# Patient Record
Sex: FEMALE | Race: WHITE | NOT HISPANIC OR LATINO | Employment: FULL TIME | URBAN - METROPOLITAN AREA
[De-identification: names, ages, dates, MRNs, and addresses within clinical notes are randomized per-mention and may not be internally consistent; named-entity substitution may affect disease eponyms.]

---

## 2017-06-21 ENCOUNTER — ALLSCRIPTS OFFICE VISIT (OUTPATIENT)
Dept: OTHER | Facility: OTHER | Age: 57
End: 2017-06-21

## 2017-09-06 DIAGNOSIS — Z12.31 ENCOUNTER FOR SCREENING MAMMOGRAM FOR MALIGNANT NEOPLASM OF BREAST: ICD-10-CM

## 2017-09-13 ENCOUNTER — HOSPITAL ENCOUNTER (OUTPATIENT)
Dept: RADIOLOGY | Age: 57
Discharge: HOME | End: 2017-09-13
Payer: COMMERCIAL

## 2017-09-13 DIAGNOSIS — Z12.31 ENCOUNTER FOR SCREENING MAMMOGRAM FOR MALIGNANT NEOPLASM OF BREAST: ICD-10-CM

## 2017-09-13 PROCEDURE — G0202 SCR MAMMO BI INCL CAD: HCPCS

## 2018-01-13 VITALS
HEART RATE: 70 BPM | OXYGEN SATURATION: 97 % | BODY MASS INDEX: 28.31 KG/M2 | SYSTOLIC BLOOD PRESSURE: 142 MMHG | DIASTOLIC BLOOD PRESSURE: 80 MMHG | WEIGHT: 180.38 LBS | HEIGHT: 67 IN | TEMPERATURE: 97.2 F

## 2018-01-15 NOTE — RESULT NOTES
Message   please call pt , PAP is normal      Verified Results  (1) THIN PREP PAP WITH IMAGING [id number] 12:00AM Ronit Winn     Test Name Result Flag Reference   LAB AP CASE REPORT (Report)     Gynecologic Cytology Report            Case: [id number]                  Authorizing Provider: Rachael Escalante MD   Collected:      03/15/2016           First Screen:     JEROME Ball    Received:      03/17/2016 1206        Specimen:  LIQUID-BASED PAP, SCREENING, Cervix   LAB AP GYN PRIMARY INTERPRETATION      Negative for intraepithelial lesion or malignancy   LAB AP GYN SPECIMEN ADEQUACY      Satisfactory for evaluation  (See note)   LAB AP GYN NOTE      No endocervical cells identified  It is difficult to differentiate between   squamous metaplastic cells and parabasal cells in atrophic specimens due   to numerous causes including menopause, postpartum changes and   progestational agents  LAB AP GYN ADDITIONAL INFORMATION (Report)     [employer]'s FDA approved ,  and ThinPrep Imaging System are   utilized with strict adherence to the 's instruction manual to   prepare gynecologic and non-gynecologic cytology specimens for the   production of ThinPrep slides as well as for gynecologic ThinPrep imaging  These processes have been validated by our laboratory and/or by the     The Pap test is not a diagnostic procedure and should not be used as the   sole means to detect cervical cancer  It is only a screening procedure to   aid in the detection of cervical cancer and its precursors  Both   false-negative and false-positive results have been experienced  Your   patient's test result should be interpreted in this context together with   the history and clinical findings  Signatures   Electronically signed by :  ISMAEL Loyola ; Mar 20 2016 11:34AM EST                       (Author)

## 2018-02-05 DIAGNOSIS — J45.40 MODERATE PERSISTENT ASTHMA, UNSPECIFIED WHETHER COMPLICATED: Primary | ICD-10-CM

## 2018-02-05 RX ORDER — BUDESONIDE AND FORMOTEROL FUMARATE DIHYDRATE 160; 4.5 UG/1; UG/1
AEROSOL RESPIRATORY (INHALATION)
Qty: 10.2 INHALER | Refills: 1 | Status: SHIPPED | OUTPATIENT
Start: 2018-02-05 | End: 2018-04-20 | Stop reason: SDUPTHER

## 2018-04-18 ENCOUNTER — OFFICE VISIT (OUTPATIENT)
Dept: FAMILY MEDICINE CLINIC | Facility: CLINIC | Age: 58
End: 2018-04-18
Payer: COMMERCIAL

## 2018-04-18 VITALS
HEIGHT: 66 IN | TEMPERATURE: 97.9 F | SYSTOLIC BLOOD PRESSURE: 110 MMHG | DIASTOLIC BLOOD PRESSURE: 70 MMHG | WEIGHT: 179.6 LBS | HEART RATE: 80 BPM | RESPIRATION RATE: 16 BRPM | BODY MASS INDEX: 28.87 KG/M2

## 2018-04-18 DIAGNOSIS — Z01.419 WELL WOMAN EXAM: ICD-10-CM

## 2018-04-18 DIAGNOSIS — Z13.6 SCREENING FOR CARDIOVASCULAR CONDITION: ICD-10-CM

## 2018-04-18 DIAGNOSIS — J45.40 MODERATE PERSISTENT ASTHMA WITHOUT COMPLICATION: Primary | ICD-10-CM

## 2018-04-18 DIAGNOSIS — R53.83 OTHER FATIGUE: ICD-10-CM

## 2018-04-18 DIAGNOSIS — Z23 NEED FOR TDAP VACCINATION: ICD-10-CM

## 2018-04-18 PROCEDURE — 99396 PREV VISIT EST AGE 40-64: CPT | Performed by: FAMILY MEDICINE

## 2018-04-18 PROCEDURE — 90471 IMMUNIZATION ADMIN: CPT | Performed by: FAMILY MEDICINE

## 2018-04-18 PROCEDURE — 90715 TDAP VACCINE 7 YRS/> IM: CPT | Performed by: FAMILY MEDICINE

## 2018-04-18 RX ORDER — ALBUTEROL SULFATE 90 UG/1
AEROSOL, METERED RESPIRATORY (INHALATION)
Qty: 1 INHALER | Refills: 5 | Status: SHIPPED | OUTPATIENT
Start: 2018-04-18 | End: 2020-11-05 | Stop reason: SDUPTHER

## 2018-04-18 RX ORDER — ALBUTEROL SULFATE 2.5 MG/3ML
2.5 SOLUTION RESPIRATORY (INHALATION) EVERY 4 HOURS PRN
Qty: 150 ML | Refills: 5 | Status: SHIPPED | OUTPATIENT
Start: 2018-04-18 | End: 2020-11-05 | Stop reason: SDUPTHER

## 2018-04-18 NOTE — PROGRESS NOTES
FAMILY PRACTICE OFFICE VISIT       NAME: Tasneem Aldridge  AGE: 63 y o  SEX: female       : 1960        MRN: [id number]    DATE: 2018  TIME: 11:16 PM    Assessment and Plan     Problem List Items Addressed This Visit     Asthma - Primary    Relevant Medications    albuterol (PROVENTIL HFA,VENTOLIN HFA) 90 mcg/act inhaler    albuterol (2 5 mg/3 mL) 0 083 % nebulizer solution    Other Relevant Orders    Nebulizer    Nebulizer Supplies      Other Visit Diagnoses     Well woman exam        Screening for cardiovascular condition        Relevant Orders    Lipid Panel with Direct LDL reflex    Other fatigue        Relevant Orders    CBC    Comprehensive metabolic panel    TSH, 3rd generation    Need for Tdap vaccination        Relevant Orders    TDAP VACCINE GREATER THAN OR EQUAL TO 8YO IM (Completed)        Annual well exam   Routine blood work orders as outlined above  Patient is up-to-date with colonoscopy and mammography  She will be due for Pap smear in   Asthma-well controlled, continue Symbicort 2 puffs b i d  and albuterol as needed  Patient uses over-the-counter Zyrtec as needed for symptoms of  seasonal allergies  Adacel administered today  There are no Patient Instructions on file for this visit  Chief Complaint     Chief Complaint   Patient presents with    Physical Exam     Pt is here for annual physical and whooping cough vaccine        History of Present Illness     Patient presents for annual well exam   She has been feeling well and offers no complaints  She is up-to-date with colonoscopy and mammography  Eyes her last Pap smear was normal in 2015, no history of abnormal Paps  Asthma symptoms are well controlled on Symbicort 2 puffs twice a day  Patient uses albuterol on a as needed basis only  Patient uses over-the-counter antihistamines on as needed basis during  in spring seasonal allergies  No recent blood work    She is expecting her 1st grandchild and would like to proceed with Tdap vaccination        Review of Systems   Review of Systems   All other systems reviewed and are negative  Active Problem List     Patient Active Problem List   Diagnosis    Asthma       Past Medical History:  Past Medical History:   Diagnosis Date    Allergic     Anemia     Asthma     Pneumonia        Past Surgical History:  Past Surgical History:   Procedure Laterality Date    COLONOSCOPY N/A 4/29/2016    Procedure: COLONOSCOPY;  Surgeon: Jacinta Padilla DO;  Location: [facility] GI [facility]; Service:        Family History:  Family History   Problem Relation Age of Onset    Other Father      bundle branch block; white blood cell disorder    Asthma Daughter        Social History:  Social History     Social History    Marital status: /Civil Union     Spouse name: N/A    Number of children: N/A    Years of education: N/A     Occupational History    Not on file  Social History Main Topics    Smoking status: Never Smoker    Smokeless tobacco: Never Used    Alcohol use Yes      Comment: occasional    Drug use: No    Sexual activity: Not on file     Other Topics Concern    Not on file     Social History Narrative    , works full time       Objective     Vitals:    04/18/18 0812   BP: 110/70   Pulse: 80   Resp: 16   Temp: 97 9 °F (36 6 °C)     Wt Readings from Last 3 Encounters:   04/18/18 81 5 kg (179 lb 9 6 oz)   06/21/17 81 8 kg (180 lb 6 1 oz)   04/29/16 79 4 kg (175 lb)       Physical Exam   Constitutional: She is oriented to person, place, and time  She appears well-developed and well-nourished  HENT:   Head: Normocephalic and atraumatic  Eyes: Conjunctivae are normal    Neck: Neck supple  Carotid bruit is not present  No thyromegaly present  Cardiovascular: Normal rate, regular rhythm and normal heart sounds  No murmur heard  Pulmonary/Chest: Effort normal and breath sounds normal  No respiratory distress  She has no wheezes  She has no rales  Abdominal: Soft  Normal appearance and bowel sounds are normal  She exhibits no distension and no abdominal bruit  There is no tenderness  Musculoskeletal: Normal range of motion  She exhibits no edema  Neurological: She is alert and oriented to person, place, and time  No cranial nerve deficit  Psychiatric: She has a normal mood and affect  Her behavior is normal    Nursing note and vitals reviewed  Pertinent Laboratory/Diagnostic Studies:  Lab Results   Component Value Date    BUN 19 02/03/2016    CREATININE 0 85 02/03/2016    CALCIUM 9 5 02/03/2016     02/03/2016    K 4 3 02/03/2016    CO2 25 02/03/2016     02/03/2016     Lab Results   Component Value Date    ALT 12 02/03/2016    AST 15 02/03/2016    ALKPHOS 52 02/03/2016    BILITOT 0 3 02/03/2016       Lab Results   Component Value Date    WBC 4 6 02/03/2016    HGB 12 5 02/03/2016    HCT 38 0 02/03/2016    MCV 91 8 02/03/2016     02/03/2016       No results found for: TSH    Lab Results   Component Value Date    CHOL 205 (H) 02/03/2016     Lab Results   Component Value Date    TRIG 68 02/03/2016     Lab Results   Component Value Date    HDL 72 02/03/2016     No results found for: LDLCALC  No results found for: HGBA1C    Results for orders placed or performed in visit on 03/15/16   Liquid-based pap, screening   Result Value Ref Range    Case Report       Gynecologic Cytology Report                       Case: [id number]                                  Authorizing Provider:  Camilla Burger MD     Collected:           03/15/2016                   First Screen:          JEROME Hernandez       Received:            03/17/2016 1207              Specimen:    LIQUID-BASED PAP, SCREENING, Cervix                                                        Primary Interpretation Negative for intraepithelial lesion or malignancy     Specimen Adequacy Satisfactory for evaluation   (See note)     Note       No endocervical cells identified  It is difficult to differentiate between squamous metaplastic cells and parabasal cells in atrophic specimens due to numerous causes including menopause, postpartum changes and progestational agents  Additional Information       [employer]'s FDA approved ,  and ThinPrep Imaging System are utilized with strict adherence to the 's instruction manual to prepare gynecologic and non-gynecologic cytology specimens for the production of ThinPrep slides as well as for gynecologic ThinPrep imaging  These processes have been validated by our laboratory and/or by the   The Pap test is not a diagnostic procedure and should not be used as the sole means to detect cervical cancer  It is only a screening procedure to aid in the detection of cervical cancer and its precursors  Both false-negative and false-positive results have been experienced  Your patient's test result should be interpreted in this context together with the history and clinical findings  Orders Placed This Encounter   Procedures    Nebulizer    Nebulizer Supplies    TDAP VACCINE GREATER THAN OR EQUAL TO 6YO IM    CBC    Comprehensive metabolic panel    Lipid Panel with Direct LDL reflex    TSH, 3rd generation       ALLERGIES:  Allergies   Allergen Reactions    Fluticasone-Salmeterol GI Intolerance     Category: Adverse Reaction;     Lac Bovis GI Intolerance    Milk-Related Compounds     Mometasone Furo-Formoterol Fum GI Intolerance     Category:  Adverse Reaction;     Other      Animal dander, pollen, and seasonal        Current Medications     Current Outpatient Prescriptions   Medication Sig Dispense Refill    albuterol (2 5 mg/3 mL) 0 083 % nebulizer solution Take 3 mL (2 5 mg total) by nebulization every 4 (four) hours as needed for wheezing or shortness of breath 150 mL 5    SYMBICORT 160-4 5 MCG/ACT inhaler 2 PUFFS TWICE DAILY 10 2 Inhaler 1    albuterol (PROVENTIL HFA,VENTOLIN HFA) 90 mcg/act inhaler 2 puffs QID PRN,Patient prefers Ventolin brand 1 Inhaler 5     No current facility-administered medications for this visit            Health Maintenance     Health Maintenance   Topic Date Due    HIV SCREENING  1960    Hepatitis C Screening  1960    PNEUMOCOCCAL POLYSACCHARIDE VACCINE AGE 2-64 HIGH RISK  02/22/1963    Depression Screening PHQ-9  02/22/1973    DTaP,Tdap,and Td Vaccines (1 - Tdap) 02/22/1982    INFLUENZA VACCINE  09/01/2017    COLONOSCOPY  04/29/2026     Immunization History   Administered Date(s) Administered    Tdap 04/18/2018       Lyla Iraheta MD

## 2018-04-20 DIAGNOSIS — J45.40 MODERATE PERSISTENT ASTHMA, UNSPECIFIED WHETHER COMPLICATED: ICD-10-CM

## 2018-04-20 RX ORDER — BUDESONIDE AND FORMOTEROL FUMARATE DIHYDRATE 160; 4.5 UG/1; UG/1
2 AEROSOL RESPIRATORY (INHALATION) 2 TIMES DAILY
Qty: 10.2 INHALER | Refills: 11 | Status: SHIPPED | OUTPATIENT
Start: 2018-04-20 | End: 2020-11-05 | Stop reason: SDUPTHER

## 2018-07-14 LAB
ALBUMIN SERPL-MCNC: 4.3 G/DL (ref 3.6–5.1)
ALBUMIN/GLOB SERPL: 1.7 (CALC) (ref 1–2.5)
ALP SERPL-CCNC: 59 U/L (ref 33–130)
ALT SERPL-CCNC: 14 U/L (ref 6–29)
AST SERPL-CCNC: 14 U/L (ref 10–35)
BASOPHILS # BLD AUTO: 18 CELLS/UL (ref 0–200)
BASOPHILS NFR BLD AUTO: 0.3 %
BILIRUB SERPL-MCNC: 0.5 MG/DL (ref 0.2–1.2)
BUN SERPL-MCNC: 14 MG/DL (ref 7–25)
BUN/CREAT SERPL: NORMAL (CALC) (ref 6–22)
CALCIUM SERPL-MCNC: 9.3 MG/DL (ref 8.6–10.4)
CHLORIDE SERPL-SCNC: 106 MMOL/L (ref 98–110)
CHOLEST SERPL-MCNC: 211 MG/DL
CHOLEST/HDLC SERPL: 2.6 (CALC)
CO2 SERPL-SCNC: 26 MMOL/L (ref 20–31)
CREAT SERPL-MCNC: 0.87 MG/DL (ref 0.5–1.05)
EOSINOPHIL # BLD AUTO: 186 CELLS/UL (ref 15–500)
EOSINOPHIL NFR BLD AUTO: 3.1 %
ERYTHROCYTE [DISTWIDTH] IN BLOOD BY AUTOMATED COUNT: 12.8 % (ref 11–15)
GLOBULIN SER CALC-MCNC: 2.5 G/DL (CALC) (ref 1.9–3.7)
GLUCOSE SERPL-MCNC: 98 MG/DL (ref 65–99)
HCT VFR BLD AUTO: 38.9 % (ref 35–45)
HDLC SERPL-MCNC: 80 MG/DL
HGB BLD-MCNC: 13.2 G/DL (ref 11.7–15.5)
LDLC SERPL CALC-MCNC: 116 MG/DL (CALC)
LYMPHOCYTES # BLD AUTO: 1908 CELLS/UL (ref 850–3900)
LYMPHOCYTES NFR BLD AUTO: 31.8 %
MCH RBC QN AUTO: 30.9 PG (ref 27–33)
MCHC RBC AUTO-ENTMCNC: 33.9 G/DL (ref 32–36)
MCV RBC AUTO: 91.1 FL (ref 80–100)
MONOCYTES # BLD AUTO: 468 CELLS/UL (ref 200–950)
MONOCYTES NFR BLD AUTO: 7.8 %
NEUTROPHILS # BLD AUTO: 3420 CELLS/UL (ref 1500–7800)
NEUTROPHILS NFR BLD AUTO: 57 %
NONHDLC SERPL-MCNC: 131 MG/DL (CALC)
PLATELET # BLD AUTO: 269 THOUSAND/UL (ref 140–400)
PMV BLD REES-ECKER: 10.7 FL (ref 7.5–12.5)
POTASSIUM SERPL-SCNC: 4.2 MMOL/L (ref 3.5–5.3)
PROT SERPL-MCNC: 6.8 G/DL (ref 6.1–8.1)
RBC # BLD AUTO: 4.27 MILLION/UL (ref 3.8–5.1)
SL AMB EGFR AFRICAN AMERICAN: 86 ML/MIN/1.73M2
SL AMB EGFR NON AFRICAN AMERICAN: 74 ML/MIN/1.73M2
SODIUM SERPL-SCNC: 139 MMOL/L (ref 135–146)
TRIGL SERPL-MCNC: 59 MG/DL
TSH SERPL-ACNC: 1.25 MIU/L (ref 0.4–4.5)
WBC # BLD AUTO: 6 THOUSAND/UL (ref 3.8–10.8)

## 2018-07-19 ENCOUNTER — TELEPHONE (OUTPATIENT)
Dept: FAMILY MEDICINE CLINIC | Facility: CLINIC | Age: 58
End: 2018-07-19

## 2018-07-19 NOTE — TELEPHONE ENCOUNTER
----- Message from Juan Saleh MD sent at 7/18/2018  6:54 PM EDT -----  Please contact patient, all blood work is normal   Thank you

## 2019-02-02 ENCOUNTER — OFFICE VISIT (OUTPATIENT)
Dept: FAMILY MEDICINE CLINIC | Facility: CLINIC | Age: 59
End: 2019-02-02
Payer: COMMERCIAL

## 2019-02-02 VITALS
DIASTOLIC BLOOD PRESSURE: 78 MMHG | HEIGHT: 66 IN | TEMPERATURE: 97.3 F | WEIGHT: 184.8 LBS | HEART RATE: 76 BPM | SYSTOLIC BLOOD PRESSURE: 118 MMHG | RESPIRATION RATE: 16 BRPM | BODY MASS INDEX: 29.7 KG/M2

## 2019-02-02 DIAGNOSIS — E55.9 VITAMIN D DEFICIENCY: ICD-10-CM

## 2019-02-02 DIAGNOSIS — Z13.6 SCREENING FOR CARDIOVASCULAR CONDITION: ICD-10-CM

## 2019-02-02 DIAGNOSIS — Z12.39 SCREENING FOR BREAST CANCER: ICD-10-CM

## 2019-02-02 DIAGNOSIS — Z00.00 WELL ADULT EXAM: Primary | ICD-10-CM

## 2019-02-02 DIAGNOSIS — R53.83 FATIGUE, UNSPECIFIED TYPE: ICD-10-CM

## 2019-02-02 DIAGNOSIS — J45.40 MODERATE PERSISTENT ASTHMA WITHOUT COMPLICATION: ICD-10-CM

## 2019-02-02 PROCEDURE — 99396 PREV VISIT EST AGE 40-64: CPT | Performed by: FAMILY MEDICINE

## 2019-02-02 NOTE — PROGRESS NOTES
FAMILY PRACTICE OFFICE VISIT       NAME: Jose G Lazaro  AGE: 63 y o  SEX: female       : 1960        MRN: [id number]        Assessment and Plan     Problem List Items Addressed This Visit     Asthma      Other Visit Diagnoses     Well adult exam    -  Primary    Screening for breast cancer        Relevant Orders    Mammo screening bilateral w 3d & cad    Screening for cardiovascular condition        Relevant Orders    Lipid Panel with Direct LDL reflex    Fatigue, unspecified type        Relevant Orders    CBC    Comprehensive metabolic panel    TSH, 3rd generation    Vitamin D 25 hydroxy    Vitamin D deficiency        Relevant Orders    Vitamin D 25 hydroxy       Annual well exam   Patient is up-to-date with colonoscopy and flu vaccine  She will proceed with mammography and will schedule annual gyn exam/ Pap smear  Blood work orders as outlined above  Asthma symptoms are well controlled on Symbicort 2 puffs once a day with rare use of rescue inhalers  Patient is recovering from cold symptoms, exam is unremarkable  Will hold off any prescription medications as she is feeling significantly better  We discussed importance of physical exercise  Patient is motivated to start exercise routine, continue on healthy diet and lose weight  BMI Counseling: Body mass index is 30 05 kg/m²  Discussed the patient's BMI with her  The BMI is above average  BMI counseling and education was provided to the patient  Nutrition recommendations include 3-5 servings of fruits/vegetables daily, consuming healthier snacks, moderation in carbohydrate intake, increasing intake of lean protein, reducing intake of saturated fat and trans fat and reducing intake of cholesterol  Exercise recommendations include exercising 3-5 times per week  There are no Patient Instructions on file for this visit  M*psicofxp software was used to dictate this note  It may contain errors with dictating incorrect words/spelling   Please contact provider directly with any questions  Chief Complaint     Chief Complaint   Patient presents with    Annual Exam     Pt is here for annual exam        History of Present Illness     Patient presents for annual well exam   She feels well and offers no complaints  Daily medications include Symbicort that she uses as 2 puffs daily  Asthma symptoms are well controlled  She is recovering from the cold and denies any chest tightness, wheezing or fever  Patient is past due mammography and Pap smear  She is up-to-date with colonoscopy, last study 2016  She denies chest pain, palpitations, shortness of breath or dizziness  No abdominal pain or dyspepsia  She sleeps well  She has been drinking lot of water and is consuming healthy diet  Patient has not been exercising regularly and is motivated to start exercise routine and lose weight  No recent blood work  We discussed shingles vaccination, patient will research Shingrix vaccination  She is up-to-date with flu vaccine and Tdap  Review of Systems   Review of Systems   Constitutional: Negative  HENT: Negative  Eyes: Negative  Respiratory: Negative  Cardiovascular: Negative  Gastrointestinal: Negative  Endocrine: Negative  Genitourinary: Negative  Musculoskeletal: Negative  Skin: Negative  Neurological: Negative  Hematological: Negative  Psychiatric/Behavioral: Negative  Active Problem List     Patient Active Problem List   Diagnosis    Asthma       Past Medical History:  Past Medical History:   Diagnosis Date    Allergic     Anemia     Asthma     Pneumonia        Past Surgical History:  Past Surgical History:   Procedure Laterality Date    COLONOSCOPY N/A 4/29/2016    Procedure: COLONOSCOPY;  Surgeon: Nirmala Reno DO;  Location: [facility] GI LAB;   Service:        Family History:  Family History   Problem Relation Age of Onset    Other Father         bundle branch block; white blood cell disorder    Asthma Daughter        Social History:  Social History     Social History    Marital status: /Civil Union     Spouse name: N/A    Number of children: N/A    Years of education: N/A     Occupational History    Not on file  Social History Main Topics    Smoking status: Never Smoker    Smokeless tobacco: Never Used    Alcohol use Yes      Comment: occasional    Drug use: No    Sexual activity: Not on file     Other Topics Concern    Not on file     Social History Narrative    , works full time     PHQ-9 Depression Screening    PHQ-9:    Frequency of the following problems over the past two weeks:       Little interest or pleasure in doing things:  0 - not at all  Feeling down, depressed, or hopeless:  0 - not at all  PHQ-2 Score:  0               Objective     Vitals:    02/02/19 0840 02/02/19 0912   BP: 128/68 118/78   BP Location: Left arm    Patient Position: Sitting    Cuff Size: Adult    Pulse: 76    Resp: 16    Temp: (!) 97 3 °F (36 3 °C)    TempSrc: Tympanic    Weight: 83 8 kg (184 lb 12 8 oz)    Height: 5' 5 75" (1 67 m)      Wt Readings from Last 3 Encounters:   02/02/19 83 8 kg (184 lb 12 8 oz)   04/18/18 81 5 kg (179 lb 9 6 oz)   06/21/17 81 8 kg (180 lb 6 1 oz)       Physical Exam   Constitutional: She is oriented to person, place, and time  She appears well-developed and well-nourished  HENT:   Head: Normocephalic and atraumatic  Mouth/Throat: No oropharyngeal exudate  Eyes: Conjunctivae are normal    Neck: Neck supple  No JVD present  Carotid bruit is not present  No thyromegaly present  Cardiovascular: Normal rate, regular rhythm and normal heart sounds  No murmur heard  Pulmonary/Chest: Effort normal and breath sounds normal  No respiratory distress  She has no wheezes  Abdominal: Soft  Bowel sounds are normal  She exhibits no distension and no abdominal bruit  There is no tenderness  There is no rebound     Musculoskeletal: Normal range of motion  She exhibits no edema  Lymphadenopathy:     She has no cervical adenopathy  Neurological: She is alert and oriented to person, place, and time  No cranial nerve deficit  Coordination normal    Psychiatric: She has a normal mood and affect  Her behavior is normal    Nursing note and vitals reviewed        Pertinent Laboratory/Diagnostic Studies:  Lab Results   Component Value Date    BUN 14 07/13/2018    CREATININE 0 85 02/03/2016    CALCIUM 9 3 07/13/2018     02/03/2016    K 4 2 07/13/2018    CO2 26 07/13/2018     07/13/2018     Lab Results   Component Value Date    ALT 14 07/13/2018    AST 14 07/13/2018    ALKPHOS 59 07/13/2018    BILITOT 0 3 02/03/2016       Lab Results   Component Value Date    WBC 6 0 07/13/2018    HGB 13 2 07/13/2018    HCT 38 9 07/13/2018    MCV 91 1 07/13/2018     07/13/2018       Lab Results   Component Value Date    TSH 1 25 07/13/2018       Lab Results   Component Value Date    CHOL 205 (H) 02/03/2016     Lab Results   Component Value Date    TRIG 59 07/13/2018     Lab Results   Component Value Date    HDL 80 07/13/2018     No results found for: LDLCALC  No results found for: HGBA1C    Results for orders placed or performed in visit on 07/13/18   Lipid Panel with Direct LDL reflex   Result Value Ref Range    Total Cholesterol 211 (H) <200 mg/dL    HDL 80 >50 mg/dL    Triglycerides 59 <150 mg/dL    LDL Direct 116 (H) mg/dL (calc)    Chol HDLC Ratio 2 6 <5 0 (calc)    Non-HDL Cholesterol 131 (H) <130 mg/dL (calc)   Comprehensive metabolic panel   Result Value Ref Range    Glucose, Random 98 65 - 99 mg/dL    BUN 14 7 - 25 mg/dL    Creatinine 0 87 0 50 - 1 05 mg/dL    eGFR Non  74 > OR = 60 mL/min/1 73m2    eGFR  86 > OR = 60 mL/min/1 73m2    SL AMB BUN/CREATININE RATIO NOT APPLICABLE 6 - 22 (calc)    Sodium 139 135 - 146 mmol/L    Potassium 4 2 3 5 - 5 3 mmol/L    Chloride 106 98 - 110 mmol/L    CO2 26 20 - 31 mmol/L    SL AMB CALCIUM 9 3 8 6 - 10 4 mg/dL    Protein, Total 6 8 6 1 - 8 1 g/dL    Albumin 4 3 3 6 - 5 1 g/dL    Globulin 2 5 1 9 - 3 7 g/dL (calc)    Albumin/Globulin Ratio 1 7 1 0 - 2 5 (calc)    TOTAL BILIRUBIN 0 5 0 2 - 1 2 mg/dL    Alkaline Phosphatase 59 33 - 130 U/L    AST 14 10 - 35 U/L    ALT 14 6 - 29 U/L   CBC and differential   Result Value Ref Range    White Blood Cell Count 6 0 3 8 - 10 8 Thousand/uL    Red Blood Cell Count 4 27 3 80 - 5 10 Million/uL    Hemoglobin 13 2 11 7 - 15 5 g/dL    HCT 38 9 35 0 - 45 0 %    MCV 91 1 80 0 - 100 0 fL    MCH 30 9 27 0 - 33 0 pg    MCHC 33 9 32 0 - 36 0 g/dL    RDW 12 8 11 0 - 15 0 %    Platelet Count 524 829 - 400 Thousand/uL    SL AMB MPV 10 7 7 5 - 12 5 fL    Neutrophils (Absolute) 3,420 1,500 - 7,800 cells/uL    Lymphocytes (Absolute) 1,908 850 - 3,900 cells/uL    Monocytes (Absolute) 468 200 - 950 cells/uL    Eosinophils (Absolute) 186 15 - 500 cells/uL    Basophils ABS 18 0 - 200 cells/uL    Neutrophils 57 %    Lymphocytes 31 8 %    Monocytes 7 8 %    Eosinophils 3 1 %    Basophils PCT 0 3 %   TSH, 3rd generation   Result Value Ref Range    TSH 1 25 0 40 - 4 50 mIU/L       Orders Placed This Encounter   Procedures    Mammo screening bilateral w 3d & cad    CBC    Comprehensive metabolic panel    Lipid Panel with Direct LDL reflex    TSH, 3rd generation    Vitamin D 25 hydroxy       ALLERGIES:  Allergies   Allergen Reactions    Fluticasone-Salmeterol GI Intolerance     Category: Adverse Reaction;     Milk-Related Compounds Facial Swelling and Lip Swelling    Mometasone Furo-Formoterol Fum GI Intolerance     Category:  Adverse Reaction;     Other Sneezing     Animal dander, pollen, and seasonal        Current Medications     Current Outpatient Prescriptions   Medication Sig Dispense Refill    albuterol (2 5 mg/3 mL) 0 083 % nebulizer solution Take 3 mL (2 5 mg total) by nebulization every 4 (four) hours as needed for wheezing or shortness of breath 150 mL 5    albuterol (PROVENTIL HFA,VENTOLIN HFA) 90 mcg/act inhaler 2 puffs QID PRN,Patient prefers Ventolin brand 1 Inhaler 5    budesonide-formoterol (SYMBICORT) 160-4 5 mcg/act inhaler Inhale 2 puffs 2 (two) times a day (Patient taking differently: Inhale 2 puffs daily  ) 10 2 Inhaler 11     No current facility-administered medications for this visit            Health Maintenance     Health Maintenance   Topic Date Due    Hepatitis C Screening  1960    PAP SMEAR  03/15/2019    MAMMOGRAM  09/13/2019    Depression Screening PHQ  02/02/2020    CRC Screening: Colonoscopy  04/29/2026    DTaP,Tdap,and Td Vaccines (2 - Td) 04/18/2028    INFLUENZA VACCINE  Completed     Immunization History   Administered Date(s) Administered     Influenza (IM) Preservative Free 12/31/2018    Tdap 04/18/2018       Carly Mars MD

## 2019-02-19 ENCOUNTER — ANNUAL EXAM (OUTPATIENT)
Dept: FAMILY MEDICINE CLINIC | Facility: CLINIC | Age: 59
End: 2019-02-19
Payer: COMMERCIAL

## 2019-02-19 VITALS
WEIGHT: 189 LBS | RESPIRATION RATE: 16 BRPM | HEART RATE: 78 BPM | DIASTOLIC BLOOD PRESSURE: 80 MMHG | BODY MASS INDEX: 31.49 KG/M2 | TEMPERATURE: 96.7 F | SYSTOLIC BLOOD PRESSURE: 110 MMHG | HEIGHT: 65 IN

## 2019-02-19 DIAGNOSIS — Z01.419 ENCOUNTER FOR CERVICAL PAP SMEAR WITH PELVIC EXAM: Primary | ICD-10-CM

## 2019-02-19 DIAGNOSIS — Z12.4 SCREENING FOR CERVICAL CANCER: ICD-10-CM

## 2019-02-19 PROCEDURE — 87624 HPV HI-RISK TYP POOLED RSLT: CPT | Performed by: FAMILY MEDICINE

## 2019-02-19 PROCEDURE — G0145 SCR C/V CYTO,THINLAYER,RESCR: HCPCS | Performed by: FAMILY MEDICINE

## 2019-02-19 PROCEDURE — 99213 OFFICE O/P EST LOW 20 MIN: CPT | Performed by: FAMILY MEDICINE

## 2019-02-20 NOTE — PROGRESS NOTES
FAMILY PRACTICE OFFICE VISIT       NAME: Shanice Gonzales  AGE: 63 y o  SEX: female       : 1960        MRN: [id number]        Assessment and Plan     Problem List Items Addressed This Visit     None      Visit Diagnoses     Encounter for cervical Pap smear with pelvic exam    -  Primary    Relevant Orders    Liquid-based pap, screening    Screening for cervical cancer        Relevant Orders    Liquid-based pap, screening    HPV High Risk       Annual pelvic exam   Pap smear and HPV are pending  No history of abnormal Pap smear  Advised to continue with annual mammography  There are no Patient Instructions on file for this visit  [employer] software was used to dictate this note  It may contain errors with dictating incorrect words/spelling  Please contact provider directly with any questions  Chief Complaint     Chief Complaint   Patient presents with    Gynecologic Exam     Patient is here for her yearly exam        History of Present Illness     Patient presents for pelvic exam/Pap smear  She is in menopause  Denies hot flashes  No vaginal bleeding, no discharge, no pelvic pain  She will be scheduling mammography shortly  Patient offers no complaints other complaints today  Review of Systems   Review of Systems   Constitutional: Negative  HENT: Negative  Respiratory: Negative  Cardiovascular: Negative  Gastrointestinal: Negative  Genitourinary: Negative  Musculoskeletal: Negative  Neurological: Negative  Hematological: Negative  Psychiatric/Behavioral: Negative          Active Problem List     Patient Active Problem List   Diagnosis    Asthma       Past Medical History:  Past Medical History:   Diagnosis Date    Allergic     Anemia     Asthma     Pneumonia        Past Surgical History:  Past Surgical History:   Procedure Laterality Date    COLONOSCOPY N/A 2016    Procedure: COLONOSCOPY;  Surgeon: Michelle Mcintyre DO;  Location: [facility];  Service:        Family History:  Family History   Problem Relation Age of Onset    Other Father         bundle branch block; white blood cell disorder    Asthma Daughter        Social History:  Social History     Socioeconomic History    Marital status: /Civil Union     Spouse name: Not on file    Number of children: Not on file    Years of education: Not on file    Highest education level: Not on file   Occupational History    Not on file   Social Needs    Financial resource strain: Not on file    Food insecurity:     Worry: Not on file     Inability: Not on file    Transportation needs:     Medical: Not on file     Non-medical: Not on file   Tobacco Use    Smoking status: Never Smoker    Smokeless tobacco: Never Used   Substance and Sexual Activity    Alcohol use: Yes     Comment: occasional    Drug use: No    Sexual activity: Not on file   Lifestyle    Physical activity:     Days per week: Not on file     Minutes per session: Not on file    Stress: Not on file   Relationships    Social connections:     Talks on phone: Not on file     Gets together: Not on file     Attends Jainism service: Not on file     Active member of club or organization: Not on file     Attends meetings of clubs or organizations: Not on file     Relationship status: Not on file    Intimate partner violence:     Fear of current or ex partner: Not on file     Emotionally abused: Not on file     Physically abused: Not on file     Forced sexual activity: Not on file   Other Topics Concern    Not on file   Social History Narrative    , works full time       Objective     Vitals:    02/19/19 1848   BP: 110/80   Pulse: 78   Resp: 16   Temp: (!) 96 7 °F (35 9 °C)   TempSrc: Tympanic   Weight: 85 7 kg (189 lb)   Height: 5' 5" (1 651 m)     Wt Readings from Last 3 Encounters:   02/19/19 85 7 kg (189 lb)   02/02/19 83 8 kg (184 lb 12 8 oz)   04/18/18 81 5 kg (179 lb 9 6 oz)       Physical Exam   Constitutional: She is oriented to person, place, and time  She appears well-developed and well-nourished  HENT:   Head: Normocephalic and atraumatic  Neck: Neck supple  Carotid bruit is not present  No thyromegaly present  Pulmonary/Chest: Right breast exhibits no mass  Left breast exhibits no mass  No breast discharge  Breasts are symmetrical    Abdominal: Soft  Bowel sounds are normal  She exhibits no distension  There is no tenderness  Genitourinary: Vagina normal and uterus normal  No breast discharge  There is no rash on the right labia  There is no rash on the left labia  Uterus is not tender  Cervix exhibits no motion tenderness and no discharge  Right adnexum displays no mass and no tenderness  Left adnexum displays no mass, no tenderness and no fullness  No vaginal discharge found  Musculoskeletal: Normal range of motion  She exhibits no edema  Lymphadenopathy:        Right axillary: No pectoral and no lateral adenopathy present  Left axillary: No pectoral and no lateral adenopathy present  Neurological: She is alert and oriented to person, place, and time  No cranial nerve deficit  Psychiatric: She has a normal mood and affect  Her behavior is normal    Nursing note and vitals reviewed        Pertinent Laboratory/Diagnostic Studies:  Lab Results   Component Value Date    BUN 14 07/13/2018    CREATININE 0 85 02/03/2016    CALCIUM 9 3 07/13/2018     02/03/2016    K 4 2 07/13/2018    CO2 26 07/13/2018     07/13/2018     Lab Results   Component Value Date    ALT 14 07/13/2018    AST 14 07/13/2018    ALKPHOS 59 07/13/2018    BILITOT 0 3 02/03/2016       Lab Results   Component Value Date    WBC 6 0 07/13/2018    HGB 13 2 07/13/2018    HCT 38 9 07/13/2018    MCV 91 1 07/13/2018     07/13/2018       Lab Results   Component Value Date    TSH 1 25 07/13/2018       Lab Results   Component Value Date    CHOL 205 (H) 02/03/2016     Lab Results   Component Value Date    TRIG 59 07/13/2018 Lab Results   Component Value Date    HDL 80 07/13/2018     No results found for: LDLCALC  No results found for: HGBA1C    Results for orders placed or performed in visit on 07/13/18   Lipid Panel with Direct LDL reflex   Result Value Ref Range    Total Cholesterol 211 (H) <200 mg/dL    HDL 80 >50 mg/dL    Triglycerides 59 <150 mg/dL    LDL Direct 116 (H) mg/dL (calc)    Chol HDLC Ratio 2 6 <5 0 (calc)    Non-HDL Cholesterol 131 (H) <130 mg/dL (calc)   Comprehensive metabolic panel   Result Value Ref Range    Glucose, Random 98 65 - 99 mg/dL    BUN 14 7 - 25 mg/dL    Creatinine 0 87 0 50 - 1 05 mg/dL    eGFR Non  74 > OR = 60 mL/min/1 73m2    eGFR  86 > OR = 60 mL/min/1 73m2    SL AMB BUN/CREATININE RATIO NOT APPLICABLE 6 - 22 (calc)    Sodium 139 135 - 146 mmol/L    Potassium 4 2 3 5 - 5 3 mmol/L    Chloride 106 98 - 110 mmol/L    CO2 26 20 - 31 mmol/L    SL AMB CALCIUM 9 3 8 6 - 10 4 mg/dL    Protein, Total 6 8 6 1 - 8 1 g/dL    Albumin 4 3 3 6 - 5 1 g/dL    Globulin 2 5 1 9 - 3 7 g/dL (calc)    Albumin/Globulin Ratio 1 7 1 0 - 2 5 (calc)    TOTAL BILIRUBIN 0 5 0 2 - 1 2 mg/dL    Alkaline Phosphatase 59 33 - 130 U/L    AST 14 10 - 35 U/L    ALT 14 6 - 29 U/L   CBC and differential   Result Value Ref Range    White Blood Cell Count 6 0 3 8 - 10 8 Thousand/uL    Red Blood Cell Count 4 27 3 80 - 5 10 Million/uL    Hemoglobin 13 2 11 7 - 15 5 g/dL    HCT 38 9 35 0 - 45 0 %    MCV 91 1 80 0 - 100 0 fL    MCH 30 9 27 0 - 33 0 pg    MCHC 33 9 32 0 - 36 0 g/dL    RDW 12 8 11 0 - 15 0 %    Platelet Count 548 154 - 400 Thousand/uL    SL AMB MPV 10 7 7 5 - 12 5 fL    Neutrophils (Absolute) 3,420 1,500 - 7,800 cells/uL    Lymphocytes (Absolute) 1,908 850 - 3,900 cells/uL    Monocytes (Absolute) 468 200 - 950 cells/uL    Eosinophils (Absolute) 186 15 - 500 cells/uL    Basophils ABS 18 0 - 200 cells/uL    Neutrophils 57 %    Lymphocytes 31 8 %    Monocytes 7 8 %    Eosinophils 3 1 % Basophils PCT 0 3 %   TSH, 3rd generation   Result Value Ref Range    TSH 1 25 0 40 - 4 50 mIU/L       Orders Placed This Encounter   Procedures    HPV High Risk       ALLERGIES:  Allergies   Allergen Reactions    Fluticasone-Salmeterol GI Intolerance     Category: Adverse Reaction;     Milk-Related Compounds Facial Swelling and Lip Swelling    Mometasone Furo-Formoterol Fum GI Intolerance     Category: Adverse Reaction;     Other Sneezing     Animal dander, pollen, and seasonal        Current Medications     Current Outpatient Medications   Medication Sig Dispense Refill    albuterol (2 5 mg/3 mL) 0 083 % nebulizer solution Take 3 mL (2 5 mg total) by nebulization every 4 (four) hours as needed for wheezing or shortness of breath 150 mL 5    albuterol (PROVENTIL HFA,VENTOLIN HFA) 90 mcg/act inhaler 2 puffs QID PRN,Patient prefers Ventolin brand 1 Inhaler 5    budesonide-formoterol (SYMBICORT) 160-4 5 mcg/act inhaler Inhale 2 puffs 2 (two) times a day (Patient taking differently: Inhale 2 puffs daily  ) 10 2 Inhaler 11     No current facility-administered medications for this visit            Health Maintenance     Health Maintenance   Topic Date Due    Hepatitis C Screening  1960    PAP SMEAR  03/15/2019    MAMMOGRAM  09/13/2019    Depression Screening PHQ  02/02/2020    BMI: Adult  02/02/2020    BMI: Followup Plan  02/04/2020    CRC Screening: Colonoscopy  04/29/2026    DTaP,Tdap,and Td Vaccines (2 - Td) 04/18/2028    INFLUENZA VACCINE  Completed    HEPATITIS B VACCINES  Aged Out     Immunization History   Administered Date(s) Administered     Influenza (IM) Preservative Free 12/31/2018    Tdap 04/18/2018       Iris Reynolds MD

## 2019-02-25 LAB
HPV HR 12 DNA CVX QL NAA+PROBE: NEGATIVE
HPV16 DNA CVX QL NAA+PROBE: NEGATIVE
HPV18 DNA CVX QL NAA+PROBE: NEGATIVE

## 2019-02-26 ENCOUNTER — TELEPHONE (OUTPATIENT)
Dept: FAMILY MEDICINE CLINIC | Facility: CLINIC | Age: 59
End: 2019-02-26

## 2019-02-26 LAB
LAB AP GYN PRIMARY INTERPRETATION: NORMAL
Lab: NORMAL

## 2019-02-27 NOTE — TELEPHONE ENCOUNTER
----- Message from Melva Schaumann, MD sent at 2/26/2019  8:04 PM EST -----  Please contact patient  Her Pap smear is normal, HPV testing was negative  No need for Pap smear for 3 years    Thanks

## 2019-05-09 ENCOUNTER — OFFICE VISIT (OUTPATIENT)
Dept: FAMILY MEDICINE CLINIC | Facility: CLINIC | Age: 59
End: 2019-05-09
Payer: COMMERCIAL

## 2019-05-09 VITALS
RESPIRATION RATE: 16 BRPM | HEIGHT: 65 IN | WEIGHT: 183.25 LBS | HEART RATE: 100 BPM | OXYGEN SATURATION: 97 % | DIASTOLIC BLOOD PRESSURE: 80 MMHG | SYSTOLIC BLOOD PRESSURE: 110 MMHG | TEMPERATURE: 99.7 F | BODY MASS INDEX: 30.53 KG/M2

## 2019-05-09 DIAGNOSIS — J45.40 MODERATE PERSISTENT ASTHMA WITHOUT COMPLICATION: ICD-10-CM

## 2019-05-09 DIAGNOSIS — J32.9 SINUSITIS, UNSPECIFIED CHRONICITY, UNSPECIFIED LOCATION: ICD-10-CM

## 2019-05-09 DIAGNOSIS — J06.9 UPPER RESPIRATORY TRACT INFECTION, UNSPECIFIED TYPE: Primary | ICD-10-CM

## 2019-05-09 PROCEDURE — 99213 OFFICE O/P EST LOW 20 MIN: CPT | Performed by: FAMILY MEDICINE

## 2019-05-09 PROCEDURE — 3008F BODY MASS INDEX DOCD: CPT | Performed by: FAMILY MEDICINE

## 2019-05-09 RX ORDER — AZITHROMYCIN 250 MG/1
TABLET, FILM COATED ORAL
Qty: 6 TABLET | Refills: 0 | Status: SHIPPED | OUTPATIENT
Start: 2019-05-09 | End: 2019-05-13

## 2019-05-09 RX ORDER — METHYLPREDNISOLONE 4 MG/1
TABLET ORAL
Qty: 21 EACH | Refills: 0 | Status: SHIPPED | OUTPATIENT
Start: 2019-05-09 | End: 2019-12-08 | Stop reason: ALTCHOICE

## 2019-06-19 LAB
25(OH)D3 SERPL-MCNC: 29 NG/ML (ref 30–100)
ALBUMIN SERPL-MCNC: 3.8 G/DL (ref 3.6–5.1)
ALBUMIN/GLOB SERPL: 1.7 (CALC) (ref 1–2.5)
ALP SERPL-CCNC: 51 U/L (ref 33–130)
ALT SERPL-CCNC: 15 U/L (ref 6–29)
AST SERPL-CCNC: 15 U/L (ref 10–35)
BASOPHILS # BLD AUTO: 18 CELLS/UL (ref 0–200)
BASOPHILS NFR BLD AUTO: 0.4 %
BILIRUB SERPL-MCNC: 0.3 MG/DL (ref 0.2–1.2)
BUN SERPL-MCNC: 17 MG/DL (ref 7–25)
BUN/CREAT SERPL: NORMAL (CALC) (ref 6–22)
CALCIUM SERPL-MCNC: 8.9 MG/DL (ref 8.6–10.4)
CHLORIDE SERPL-SCNC: 107 MMOL/L (ref 98–110)
CHOLEST SERPL-MCNC: 190 MG/DL
CHOLEST/HDLC SERPL: 3.2 (CALC)
CO2 SERPL-SCNC: 28 MMOL/L (ref 20–32)
CREAT SERPL-MCNC: 0.89 MG/DL (ref 0.5–1.05)
EOSINOPHIL # BLD AUTO: 252 CELLS/UL (ref 15–500)
EOSINOPHIL NFR BLD AUTO: 5.6 %
ERYTHROCYTE [DISTWIDTH] IN BLOOD BY AUTOMATED COUNT: 12.7 % (ref 11–15)
GLOBULIN SER CALC-MCNC: 2.3 G/DL (CALC) (ref 1.9–3.7)
GLUCOSE SERPL-MCNC: 86 MG/DL (ref 65–99)
HCT VFR BLD AUTO: 37.6 % (ref 35–45)
HDLC SERPL-MCNC: 60 MG/DL
HGB BLD-MCNC: 12.5 G/DL (ref 11.7–15.5)
LDLC SERPL CALC-MCNC: 114 MG/DL (CALC)
LYMPHOCYTES # BLD AUTO: 1760 CELLS/UL (ref 850–3900)
LYMPHOCYTES NFR BLD AUTO: 39.1 %
MCH RBC QN AUTO: 30.3 PG (ref 27–33)
MCHC RBC AUTO-ENTMCNC: 33.2 G/DL (ref 32–36)
MCV RBC AUTO: 91.3 FL (ref 80–100)
MONOCYTES # BLD AUTO: 342 CELLS/UL (ref 200–950)
MONOCYTES NFR BLD AUTO: 7.6 %
NEUTROPHILS # BLD AUTO: 2129 CELLS/UL (ref 1500–7800)
NEUTROPHILS NFR BLD AUTO: 47.3 %
NONHDLC SERPL-MCNC: 130 MG/DL (CALC)
PLATELET # BLD AUTO: 250 THOUSAND/UL (ref 140–400)
PMV BLD REES-ECKER: 10.6 FL (ref 7.5–12.5)
POTASSIUM SERPL-SCNC: 4 MMOL/L (ref 3.5–5.3)
PROT SERPL-MCNC: 6.1 G/DL (ref 6.1–8.1)
RBC # BLD AUTO: 4.12 MILLION/UL (ref 3.8–5.1)
SL AMB EGFR AFRICAN AMERICAN: 83 ML/MIN/1.73M2
SL AMB EGFR NON AFRICAN AMERICAN: 71 ML/MIN/1.73M2
SODIUM SERPL-SCNC: 139 MMOL/L (ref 135–146)
TRIGL SERPL-MCNC: 64 MG/DL
TSH SERPL-ACNC: 1.05 MIU/L (ref 0.4–4.5)
WBC # BLD AUTO: 4.5 THOUSAND/UL (ref 3.8–10.8)

## 2019-06-20 ENCOUNTER — TELEPHONE (OUTPATIENT)
Dept: FAMILY MEDICINE CLINIC | Facility: CLINIC | Age: 59
End: 2019-06-20

## 2019-06-28 ENCOUNTER — HOSPITAL ENCOUNTER (OUTPATIENT)
Dept: RADIOLOGY | Age: 59
Discharge: HOME | End: 2019-06-28
Payer: COMMERCIAL

## 2019-06-28 VITALS — WEIGHT: 183 LBS | HEIGHT: 65 IN | BODY MASS INDEX: 30.49 KG/M2

## 2019-06-28 DIAGNOSIS — Z12.39 SCREENING FOR BREAST CANCER: ICD-10-CM

## 2019-06-28 PROCEDURE — 77067 SCR MAMMO BI INCL CAD: CPT

## 2019-06-28 PROCEDURE — 77063 BREAST TOMOSYNTHESIS BI: CPT

## 2019-12-08 ENCOUNTER — OFFICE VISIT (OUTPATIENT)
Dept: URGENT CARE | Facility: CLINIC | Age: 59
End: 2019-12-08
Payer: COMMERCIAL

## 2019-12-08 ENCOUNTER — APPOINTMENT (OUTPATIENT)
Dept: RADIOLOGY | Facility: CLINIC | Age: 59
End: 2019-12-08
Payer: COMMERCIAL

## 2019-12-08 VITALS
SYSTOLIC BLOOD PRESSURE: 116 MMHG | DIASTOLIC BLOOD PRESSURE: 70 MMHG | WEIGHT: 184 LBS | HEIGHT: 65 IN | BODY MASS INDEX: 30.66 KG/M2 | HEART RATE: 79 BPM | TEMPERATURE: 97.4 F | RESPIRATION RATE: 18 BRPM | OXYGEN SATURATION: 98 %

## 2019-12-08 DIAGNOSIS — M54.2 NECK PAIN: ICD-10-CM

## 2019-12-08 DIAGNOSIS — M54.2 NECK PAIN: Primary | ICD-10-CM

## 2019-12-08 PROCEDURE — 96372 THER/PROPH/DIAG INJ SC/IM: CPT | Performed by: NURSE PRACTITIONER

## 2019-12-08 PROCEDURE — S9083 URGENT CARE CENTER GLOBAL: HCPCS | Performed by: NURSE PRACTITIONER

## 2019-12-08 PROCEDURE — G0382 LEV 3 HOSP TYPE B ED VISIT: HCPCS | Performed by: NURSE PRACTITIONER

## 2019-12-08 PROCEDURE — 72050 X-RAY EXAM NECK SPINE 4/5VWS: CPT

## 2019-12-08 RX ORDER — CYCLOBENZAPRINE HCL 5 MG
5 TABLET ORAL 3 TIMES DAILY PRN
Qty: 30 TABLET | Refills: 0 | Status: SHIPPED | OUTPATIENT
Start: 2019-12-08 | End: 2019-12-24 | Stop reason: SDUPTHER

## 2019-12-08 RX ORDER — NAPROXEN 500 MG/1
500 TABLET ORAL 2 TIMES DAILY WITH MEALS
Qty: 14 TABLET | Refills: 0 | Status: SHIPPED | OUTPATIENT
Start: 2019-12-08 | End: 2019-12-31 | Stop reason: ALTCHOICE

## 2019-12-08 RX ORDER — PREDNISONE 10 MG/1
TABLET ORAL
Qty: 21 TABLET | Refills: 0 | Status: SHIPPED | OUTPATIENT
Start: 2019-12-08 | End: 2019-12-24

## 2019-12-08 RX ORDER — KETOROLAC TROMETHAMINE 30 MG/ML
30 INJECTION, SOLUTION INTRAMUSCULAR; INTRAVENOUS ONCE
Status: COMPLETED | OUTPATIENT
Start: 2019-12-08 | End: 2019-12-08

## 2019-12-08 RX ADMIN — KETOROLAC TROMETHAMINE 30 MG: 30 INJECTION, SOLUTION INTRAMUSCULAR; INTRAVENOUS at 09:59

## 2019-12-08 NOTE — PROGRESS NOTES
Assessment/Plan    Neck pain [M54 2]  1  Neck pain  XR spine cervical complete 4 or 5 vw non injury    ketorolac (TORADOL) injection 30 mg    predniSONE 10 mg tablet    cyclobenzaprine (FLEXERIL) 5 mg tablet    naproxen (EC NAPROSYN) 500 MG EC tablet         Subjective:     Patient ID: Do Ortega is a 63 y o  female  Reason For Visit / Chief Complaint  Chief Complaint   Patient presents with    Neck Pain     Patient here with right sided neck pain that started several weeks ago but is getting worse  She reports that the pain radiates down her right arm  She had a massage and went to an [facility] yesterday for same  This is a 78-year-old female patient who presents to the urgent care today  Patient is complaining of neck pain for approximately 4 weeks  Patient states that several weeks ago she woke up and felt neck pain  Patient felt like she slept incorrectly  Patient tried using warm compresses and warm showers  Patient had minimal relief  This week she did go for massage and for acupuncture which did help her neck discomfort  Patient was having numbness and tingling in her right arm which resolved yesterday after having acupuncture  Patient is here for continued pain and evaluation  Patient denies traumatic injury  Patient denies fever or chills  Patient denies issues with urination or defecation  Patient denies chest pain or shortness of breath  Patient denies dizziness, loss of consciousness or slurred speech  Past Medical History:   Diagnosis Date    Allergic     Anemia     Asthma     Pneumonia        Past Surgical History:   Procedure Laterality Date    COLONOSCOPY N/A 4/29/2016    Procedure: COLONOSCOPY;  Surgeon: Deyanira Serrano DO;  Location: [facility];   Service:        Family History   Problem Relation Age of Onset    No Known Problems Mother     Other Father         bundle branch block; white blood cell disorder    Asthma Daughter     No Known Problems Sister     No Known Problems Maternal Grandmother     No Known Problems Maternal Grandfather     No Known Problems Paternal Grandmother     No Known Problems Paternal Grandfather     No Known Problems Sister     Asthma Daughter     No Known Problems Paternal Aunt     Kidney cancer Brother 39       Review of Systems   Constitutional: Negative for chills and fever  Respiratory: Negative for shortness of breath  Cardiovascular: Negative for chest pain  Genitourinary: Negative for difficulty urinating  Musculoskeletal: Positive for myalgias and neck pain  Negative for back pain and gait problem  Skin: Negative for color change and wound  Neurological: Negative for dizziness, facial asymmetry, speech difficulty, weakness, light-headedness, numbness and headaches  Objective:    /70 (BP Location: Left arm, Patient Position: Sitting, Cuff Size: Large)   Pulse 79   Temp (!) 97 4 °F (36 3 °C) (Tympanic)   Resp 18   Ht 5' 5" (1 651 m)   Wt 83 5 kg (184 lb)   SpO2 98%   BMI 30 62 kg/m²     Physical Exam   Constitutional: She is oriented to person, place, and time  Vital signs are normal  She appears well-developed and well-nourished  No distress  HENT:   Head: Normocephalic and atraumatic  Neck: Spinous process tenderness and muscular tenderness present  No neck rigidity  Decreased range of motion present  No edema and no erythema present  Cardiovascular: Normal rate, regular rhythm, S1 normal, S2 normal and normal heart sounds  Exam reveals no gallop and no friction rub  No murmur heard  Pulmonary/Chest: Effort normal and breath sounds normal  No stridor  No respiratory distress  She has no decreased breath sounds  She has no wheezes  She has no rhonchi  She has no rales  She exhibits no tenderness  Neurological: She is alert and oriented to person, place, and time  Skin: Skin is warm and dry  Capillary refill takes less than 2 seconds  She is not diaphoretic  Psychiatric: She has a normal mood and affect

## 2019-12-08 NOTE — PATIENT INSTRUCTIONS
We saw you today for neck pain  Take the steroid as prescribed and until completed  You may take the muscle relaxant every 8 hours to relieve muscle spasm  Please be advised that it does cause drowsiness so do allow for sleep after taking and do not drive a vehicle while taking  Follow-up with your doctor tomorrow for further evaluation  Apply heat to her neck

## 2019-12-09 ENCOUNTER — OFFICE VISIT (OUTPATIENT)
Dept: FAMILY MEDICINE CLINIC | Facility: CLINIC | Age: 59
End: 2019-12-09
Payer: COMMERCIAL

## 2019-12-09 VITALS
HEART RATE: 81 BPM | RESPIRATION RATE: 18 BRPM | DIASTOLIC BLOOD PRESSURE: 80 MMHG | HEIGHT: 65 IN | BODY MASS INDEX: 31.06 KG/M2 | WEIGHT: 186.4 LBS | TEMPERATURE: 98.6 F | SYSTOLIC BLOOD PRESSURE: 126 MMHG | OXYGEN SATURATION: 96 %

## 2019-12-09 DIAGNOSIS — R29.898 WEAKNESS OF RIGHT ARM: ICD-10-CM

## 2019-12-09 DIAGNOSIS — M50.30 DDD (DEGENERATIVE DISC DISEASE), CERVICAL: Primary | ICD-10-CM

## 2019-12-09 DIAGNOSIS — M79.2 RADICULAR PAIN OF RIGHT UPPER EXTREMITY: ICD-10-CM

## 2019-12-09 PROBLEM — M54.2 CHRONIC NECK PAIN: Status: ACTIVE | Noted: 2019-12-09

## 2019-12-09 PROBLEM — G89.29 CHRONIC NECK PAIN: Status: ACTIVE | Noted: 2019-12-09

## 2019-12-09 PROCEDURE — 3008F BODY MASS INDEX DOCD: CPT | Performed by: FAMILY MEDICINE

## 2019-12-09 PROCEDURE — 99214 OFFICE O/P EST MOD 30 MIN: CPT | Performed by: FAMILY MEDICINE

## 2019-12-09 NOTE — PROGRESS NOTES
FAMILY PRACTICE OFFICE VISIT       NAME: Olu Garcia  AGE: 63 y o  SEX: female       : 1960        MRN: [id number]        Assessment and Plan     Problem List Items Addressed This Visit     None      Visit Diagnoses     DDD (degenerative disc disease), cervical    -  Primary    Relevant Orders    MRI cervical spine wo contrast    Radicular pain of right upper extremity        Relevant Orders    MRI cervical spine wo contrast    Weakness of right arm        Relevant Orders    MRI cervical spine wo contrast        Patient presents for evaluation of neck pain, cervical disc disease and right upper extremity radiculopathy with persistent numbness, tingling and weakness of abdominal on right arm  Patient did notice partial improvement with start of prednisone taper and Flexeril  She did experience transient relief with acupuncture and massage, her symptoms have recurred within few hours after completing those treatments  Exam reveals right upper extremity weakness  X-ray of cervical spine performed at [facility] reviewed with patient today, revealing mild to moderate facet spondylosis throughout the cervical spine along with mild C3-C4 disc space narrowing  Patient will continue on prednisone taper  I advised her to use Flexeril as 10 mg q h s  And avoid medication daytime due to sedating side effects  Patient will contact me if her symptoms will be recurring as dose of prednisone is being weaned off within next few days  Patient may start naproxen once prednisone is completed, she should not be combining both medications together  I advised patient to schedule MRI of cervical spine  I will be contacting her regarding results of this study, patient will call me with any questions or concerns      There are no Patient Instructions on file for this visit  Discussed with the patient and all questioned fully answered  She will call me if any problems arise       [employer] software was used to dictate this note  It may contain errors with dictating incorrect words/spelling  Please contact provider directly with any questions  Chief Complaint     Chief Complaint   Patient presents with    Neck Pain     pinched nerve in neck       History of Present Illness     Patient presents for evaluation of  neck pain radiating to the right upper back and right upper extremity  Reportedly she has been experiencing intermittent neck discomfort within past few months, it was not quite bothersome till a week ago  Patient noticed significant worsening of neck pain and radiation of discomfort down to the right arm and all digits after driving to [facility] a week ago  Her symptoms somewhat improved after medical massage and acupuncture but have recurred  Subsequently, patient was seen at [facility] and had neck x-ray done revealing C3-C4 disc disease  Patient was treated with prednisone taper starting at 60 mg with dose reduction by 10 mg every day  She was also prescribed Flexeril and naproxen  Patient has tried Flexeril and it has helped  She actually feels slightly better today  Currently neck pain radiates down to the right elbow  Patient admits to significant weakness in her right dominant arm  Patient denies any recent or remote neck injury  She denies symptoms of chest pain, palpitations, shortness of breath or dizziness  No headache  No numbness, tingling or paresthesias elsewhere aside from right arm    Neck Pain    Associated symptoms include numbness and weakness (Right upper extremity)  Pertinent negatives include no headaches  Review of Systems   Review of Systems   Constitutional: Negative  HENT: Negative  Respiratory: Negative  Cardiovascular: Negative  Gastrointestinal: Negative  Endocrine: Negative  Genitourinary: Negative  Musculoskeletal: Positive for neck pain  Skin: Negative  Allergic/Immunologic: Negative  Neurological: Positive for weakness (Right upper extremity) and numbness  Negative for dizziness, facial asymmetry and headaches  Hematological: Negative  Psychiatric/Behavioral: Negative  Active Problem List     Patient Active Problem List   Diagnosis    Asthma    Chronic neck pain       Past Medical History:  Past Medical History:   Diagnosis Date    Allergic     Anemia     Asthma     Pneumonia        Past Surgical History:  Past Surgical History:   Procedure Laterality Date    COLONOSCOPY N/A 4/29/2016    Procedure: COLONOSCOPY;  Surgeon: Carla Rangel DO;  Location: [facility];   Service:        Family History:  Family History   Problem Relation Age of Onset    No Known Problems Mother     Other Father         bundle branch block; white blood cell disorder    Asthma Daughter     No Known Problems Sister     No Known Problems Maternal Grandmother     No Known Problems Maternal Grandfather     No Known Problems Paternal Grandmother     No Known Problems Paternal Grandfather     No Known Problems Sister     Asthma Daughter     No Known Problems Paternal Aunt     Kidney cancer Brother 39       Social History:  Social History     Socioeconomic History    Marital status: /Civil Union     Spouse name: Not on file    Number of children: Not on file    Years of education: Not on file    Highest education level: Not on file   Occupational History    Not on file   Social Needs    Financial resource strain: Not on file    Food insecurity:     Worry: Not on file     Inability: Not on file    Transportation needs:     Medical: Not on file     Non-medical: Not on file   Tobacco Use    Smoking status: Never Smoker    Smokeless tobacco: Never Used   Substance and Sexual Activity    Alcohol use: Yes     Comment: occasional    Drug use: No    Sexual activity: Not on file   Lifestyle    Physical activity:     Days per week: Not on file     Minutes per session: Not on file  Stress: Not on file   Relationships    Social connections:     Talks on phone: Not on file     Gets together: Not on file     Attends Sabianism service: Not on file     Active member of club or organization: Not on file     Attends meetings of clubs or organizations: Not on file     Relationship status: Not on file    Intimate partner violence:     Fear of current or ex partner: Not on file     Emotionally abused: Not on file     Physically abused: Not on file     Forced sexual activity: Not on file   Other Topics Concern    Not on file   Social History Narrative    , works full time           Objective     Vitals:    12/09/19 1037   BP: 126/80   BP Location: Left arm   Patient Position: Sitting   Cuff Size: Large   Pulse: 81   Resp: 18   Temp: 98 6 °F (37 °C)   TempSrc: Tympanic   SpO2: 96%   Weight: 84 6 kg (186 lb 6 4 oz)   Height: 5' 5" (1 651 m)     Wt Readings from Last 3 Encounters:   12/09/19 84 6 kg (186 lb 6 4 oz)   12/08/19 83 5 kg (184 lb)   06/28/19 83 kg (183 lb)       Physical Exam   Constitutional: She is oriented to person, place, and time  She appears well-developed and well-nourished  HENT:   Head: Normocephalic and atraumatic  Eyes: Conjunctivae are normal    Neck: Neck supple  Carotid bruit is not present  No thyromegaly present  Cardiovascular: Normal rate, regular rhythm and normal heart sounds  No murmur heard  Pulmonary/Chest: Effort normal and breath sounds normal  No respiratory distress  She has no wheezes  Abdominal: She exhibits no abdominal bruit  Musculoskeletal: Normal range of motion  She exhibits no edema  Neck:  Significantly decreased range of motion with extension and lateral range of motion, very painful right-sided lateral range of motion reproducing right upper extremity radiculopathy symptoms    Tenderness with palpation of C-spine at C5, C6 and C7  Paraspinal cervical and trapezius spasm worse on the right    Upper extremity strength:  Proximal group :right 3/5, left 5 out of, grasp 4/5 bilaterally   Neurological: She is alert and oriented to person, place, and time  No cranial nerve deficit  Coordination normal    Psychiatric: She has a normal mood and affect  Her behavior is normal    Nursing note and vitals reviewed        Pertinent Laboratory/Diagnostic Studies:  Lab Results   Component Value Date    BUN 17 06/18/2019    CREATININE 0 89 06/18/2019    CALCIUM 8 9 06/18/2019     02/03/2016    K 4 0 06/18/2019    CO2 28 06/18/2019     06/18/2019     Lab Results   Component Value Date    ALT 15 06/18/2019    AST 15 06/18/2019    ALKPHOS 51 06/18/2019    BILITOT 0 3 02/03/2016       Lab Results   Component Value Date    WBC 4 5 06/18/2019    HGB 12 5 06/18/2019    HCT 37 6 06/18/2019    MCV 91 3 06/18/2019     06/18/2019       Lab Results   Component Value Date    TSH 1 05 06/18/2019       Lab Results   Component Value Date    CHOL 205 (H) 02/03/2016     Lab Results   Component Value Date    TRIG 64 06/18/2019     Lab Results   Component Value Date    HDL 60 06/18/2019     No results found for: LDLCALC  No results found for: HGBA1C    Results for orders placed or performed in visit on 06/18/19   Lipid Panel with Direct LDL reflex   Result Value Ref Range    Total Cholesterol 190 <200 mg/dL    HDL 60 >50 mg/dL    Triglycerides 64 <150 mg/dL    LDL Direct 114 (H) mg/dL (calc)    Chol HDLC Ratio 3 2 <5 0 (calc)    Non-HDL Cholesterol 130 (H) <130 mg/dL (calc)   Comprehensive metabolic panel   Result Value Ref Range    Glucose, Random 86 65 - 99 mg/dL    BUN 17 7 - 25 mg/dL    Creatinine 0 89 0 50 - 1 05 mg/dL    eGFR Non  71 > OR = 60 mL/min/1 73m2    eGFR  83 > OR = 60 mL/min/1 73m2    SL AMB BUN/CREATININE RATIO NOT APPLICABLE 6 - 22 (calc)    Sodium 139 135 - 146 mmol/L    Potassium 4 0 3 5 - 5 3 mmol/L    Chloride 107 98 - 110 mmol/L    CO2 28 20 - 32 mmol/L    SL AMB CALCIUM 8 9 8 6 - 10 4 mg/dL Protein, Total 6 1 6 1 - 8 1 g/dL    Albumin 3 8 3 6 - 5 1 g/dL    Globulin 2 3 1 9 - 3 7 g/dL (calc)    Albumin/Globulin Ratio 1 7 1 0 - 2 5 (calc)    TOTAL BILIRUBIN 0 3 0 2 - 1 2 mg/dL    Alkaline Phosphatase 51 33 - 130 U/L    AST 15 10 - 35 U/L    ALT 15 6 - 29 U/L   CBC and differential   Result Value Ref Range    White Blood Cell Count 4 5 3 8 - 10 8 Thousand/uL    Red Blood Cell Count 4 12 3 80 - 5 10 Million/uL    Hemoglobin 12 5 11 7 - 15 5 g/dL    HCT 37 6 35 0 - 45 0 %    MCV 91 3 80 0 - 100 0 fL    MCH 30 3 27 0 - 33 0 pg    MCHC 33 2 32 0 - 36 0 g/dL    RDW 12 7 11 0 - 15 0 %    Platelet Count 449 191 - 400 Thousand/uL    SL AMB MPV 10 6 7 5 - 12 5 fL    Neutrophils (Absolute) 2,129 1,500 - 7,800 cells/uL    Lymphocytes (Absolute) 1,760 850 - 3,900 cells/uL    Monocytes (Absolute) 342 200 - 950 cells/uL    Eosinophils (Absolute) 252 15 - 500 cells/uL    Basophils ABS 18 0 - 200 cells/uL    Neutrophils 47 3 %    Lymphocytes 39 1 %    Monocytes 7 6 %    Eosinophils 5 6 %    Basophils PCT 0 4 %   TSH, 3rd generation   Result Value Ref Range    TSH 1 05 0 40 - 4 50 mIU/L   Vitamin D 25 hydroxy   Result Value Ref Range    Vitamin D, 25-Hydroxy, Serum 29 (L) 30 - 100 ng/mL       Orders Placed This Encounter   Procedures    MRI cervical spine wo contrast       ALLERGIES:  Allergies   Allergen Reactions    Fluticasone-Salmeterol GI Intolerance     Category: Adverse Reaction;     Milk-Related Compounds Facial Swelling and Lip Swelling    Mometasone Furo-Formoterol Fum GI Intolerance     Category:  Adverse Reaction;     Other Sneezing     Animal dander, pollen, and seasonal        Current Medications     Current Outpatient Medications   Medication Sig Dispense Refill    albuterol (2 5 mg/3 mL) 0 083 % nebulizer solution Take 3 mL (2 5 mg total) by nebulization every 4 (four) hours as needed for wheezing or shortness of breath 150 mL 5    albuterol (PROVENTIL HFA,VENTOLIN HFA) 90 mcg/act inhaler 2 puffs QID PRN,Patient prefers Ventolin brand 1 Inhaler 5    budesonide-formoterol (SYMBICORT) 160-4 5 mcg/act inhaler Inhale 2 puffs 2 (two) times a day (Patient taking differently: Inhale 2 puffs daily  ) 10 2 Inhaler 11    cyclobenzaprine (FLEXERIL) 5 mg tablet Take 1 tablet (5 mg total) by mouth 3 (three) times a day as needed for muscle spasms for up to 10 days 30 tablet 0    naproxen (EC NAPROSYN) 500 MG EC tablet Take 1 tablet (500 mg total) by mouth 2 (two) times a day with meals for 7 days 14 tablet 0    predniSONE 10 mg tablet Take 6 tablets first day and decrease by one tablet daily until complete  21 tablet 0     No current facility-administered medications for this visit  There are no discontinued medications      Health Maintenance     Health Maintenance   Topic Date Due    Hepatitis C Screening  1960    Pneumococcal Vaccine: Pediatrics (0 to 5 Years) and At-Risk Patients (6 to 59 Years) (1 of 1 - PPSV23) 02/22/1967    HIV Screening  02/22/1976    Depression Screening PHQ  02/02/2020    BMI: Followup Plan  02/04/2020    BMI: Adult  12/09/2020    MAMMOGRAM  06/28/2021    Cervical Cancer Screening  02/19/2022    Pneumococcal Vaccine: 65+ Years (1 of 2 - PCV13) 02/22/2026    CRC Screening: Colonoscopy  04/29/2026    DTaP,Tdap,and Td Vaccines (2 - Td) 04/18/2028    Influenza Vaccine  Completed    HIB Vaccine  Aged Out    Hepatitis B Vaccine  Aged Out    IPV Vaccine  Aged Out    Hepatitis A Vaccine  Aged Out    Meningococcal ACWY Vaccine  Aged Out    HPV Vaccine  Aged Out       Immunization History   Administered Date(s) Administered     Influenza (IM) Preservative Free 12/31/2018    Fluzone Split Quad 0 25 mL 10/28/2019    INFLUENZA 12/31/2018    Tdap 04/18/2018       Evelyn Sanders MD

## 2019-12-20 ENCOUNTER — HOSPITAL ENCOUNTER (OUTPATIENT)
Dept: MRI IMAGING | Facility: HOSPITAL | Age: 59
Discharge: HOME | End: 2019-12-20
Payer: COMMERCIAL

## 2019-12-20 DIAGNOSIS — M50.30 DDD (DEGENERATIVE DISC DISEASE), CERVICAL: ICD-10-CM

## 2019-12-20 DIAGNOSIS — R29.898 WEAKNESS OF RIGHT ARM: ICD-10-CM

## 2019-12-20 DIAGNOSIS — M79.2 RADICULAR PAIN OF RIGHT UPPER EXTREMITY: ICD-10-CM

## 2019-12-20 PROCEDURE — 72141 MRI NECK SPINE W/O DYE: CPT

## 2019-12-24 ENCOUNTER — TELEPHONE (OUTPATIENT)
Dept: FAMILY MEDICINE CLINIC | Facility: CLINIC | Age: 59
End: 2019-12-24

## 2019-12-24 DIAGNOSIS — M54.2 NECK PAIN: ICD-10-CM

## 2019-12-24 DIAGNOSIS — M47.22 OSTEOARTHRITIS OF SPINE WITH RADICULOPATHY, CERVICAL REGION: Primary | ICD-10-CM

## 2019-12-24 RX ORDER — PREDNISONE 10 MG/1
TABLET ORAL
Qty: 20 TABLET | Refills: 0 | Status: SHIPPED | OUTPATIENT
Start: 2019-12-24 | End: 2019-12-31 | Stop reason: ALTCHOICE

## 2019-12-24 RX ORDER — CYCLOBENZAPRINE HCL 5 MG
TABLET ORAL
Qty: 60 TABLET | Refills: 0 | Status: SHIPPED | OUTPATIENT
Start: 2019-12-24 | End: 2020-01-26

## 2019-12-24 NOTE — TELEPHONE ENCOUNTER
Patient returned call & states she didn't want the predniSONE refilled unless Dr Alden Mitchell wants her to be on it  She wanted the cyclobenzaprine called in  Please call to advise

## 2019-12-24 NOTE — TELEPHONE ENCOUNTER
Please contact patient  I reviewed results of neck MRI  It shows arthritic changes between C2-C3, C3-C4 and C4-C5  There is some peripheral "pinching" of the nerves but MRI reveals no significant centralstenosis and no indication for surgery which is good news! If patient's neck pain and arm weakness have improved, we can continue observation and stretching exercises  If her symptoms are still persisting-then she will need further evaluation by [facility]       Please let me know, thank you

## 2019-12-24 NOTE — TELEPHONE ENCOUNTER
Spoke with pt, she is aware of results  Made appt to speak about Spine and Pain referral for the 31st @ 11:30 am, due to symptoms still persisting  Pt also asked if you would be able to refill her predniSONE, she will be out as of today

## 2019-12-31 ENCOUNTER — OFFICE VISIT (OUTPATIENT)
Dept: FAMILY MEDICINE CLINIC | Facility: CLINIC | Age: 59
End: 2019-12-31
Payer: COMMERCIAL

## 2019-12-31 ENCOUNTER — APPOINTMENT (OUTPATIENT)
Dept: RADIOLOGY | Facility: CLINIC | Age: 59
End: 2019-12-31
Payer: COMMERCIAL

## 2019-12-31 VITALS
HEIGHT: 67 IN | RESPIRATION RATE: 16 BRPM | WEIGHT: 184 LBS | TEMPERATURE: 96.3 F | BODY MASS INDEX: 28.88 KG/M2 | HEART RATE: 78 BPM

## 2019-12-31 DIAGNOSIS — G89.29 CHRONIC NECK PAIN: ICD-10-CM

## 2019-12-31 DIAGNOSIS — M25.511 ACUTE PAIN OF RIGHT SHOULDER: Primary | ICD-10-CM

## 2019-12-31 DIAGNOSIS — M54.2 CHRONIC NECK PAIN: ICD-10-CM

## 2019-12-31 DIAGNOSIS — M25.511 ACUTE PAIN OF RIGHT SHOULDER: ICD-10-CM

## 2019-12-31 DIAGNOSIS — M47.22 OSTEOARTHRITIS OF SPINE WITH RADICULOPATHY, CERVICAL REGION: ICD-10-CM

## 2019-12-31 PROCEDURE — 73030 X-RAY EXAM OF SHOULDER: CPT

## 2019-12-31 PROCEDURE — 73050 X-RAY EXAM OF SHOULDERS: CPT

## 2019-12-31 PROCEDURE — 99213 OFFICE O/P EST LOW 20 MIN: CPT | Performed by: FAMILY MEDICINE

## 2019-12-31 RX ORDER — CELECOXIB 200 MG/1
CAPSULE ORAL
Qty: 30 CAPSULE | Refills: 1 | Status: SHIPPED | OUTPATIENT
Start: 2019-12-31 | End: 2020-02-27

## 2019-12-31 RX ORDER — COLLAGENASE CLOSTRIDIUM HIST.
1 POWDER (EA) MISCELLANEOUS DAILY
COMMUNITY

## 2019-12-31 RX ORDER — MAGNESIUM ASCORBATE
0.5 POWDER (GRAM) MISCELLANEOUS DAILY
COMMUNITY
End: 2021-11-15 | Stop reason: ALTCHOICE

## 2019-12-31 NOTE — PATIENT INSTRUCTIONS
· Start Celebrex 200 mg, 1 capsule once a day, anti-inflammatory for your shoulder/neck pain  · You may continue on Flexeril 1 or 2 tablets at bedtime as needed for painful back spasm  · Please contact [facility] to set up you consultation  · Please proceed with shoulder x-rays, walk-in  · Please try Sudafed regular strength behind the counter on a as needed basis for the next few days for symptoms of eustachian tube dysfunction, please add Zyrtec or Claritin as well for the next few days

## 2019-12-31 NOTE — PROGRESS NOTES
FAMILY PRACTICE OFFICE VISIT       NAME: Marita Goldberg  AGE: 63 y o  SEX: female       : 1960        MRN: [id number]        Assessment and Plan     Problem List Items Addressed This Visit        Other    Chronic neck pain      Other Visit Diagnoses     Acute pain of right shoulder    -  Primary    Relevant Medications    celecoxib (CeleBREX) 200 mg capsule    Other Relevant Orders    Ambulatory referral to Orthopedic Surgery    XR shoulder 2+ vw right    XR acromioclavicular joints bilateral w wo weights       Patient presents for re-evaluation of chronic neck pain, right upper extremity weakness and right posterior shoulder discomfort  She overall reports improvement after course of oral corticosteroids and current p r n  Treatment with Flexeril  We reviewed results of cervical spine MRI which is non compressive  I am concerned about possibility right shoulder tendinitis/impingement syndrome contributing to patient's symptoms  Will proceed with right shoulder and AC joint x-rays and consultation with [facility] Orthopedic surgery  Will add Celebrex 200 mg daily p r n to patient's regimen, she will continue on Flexeril p r n  at bedtime  Patient Instructions   · Start Celebrex 200 mg, 1 capsule once a day, anti-inflammatory for your shoulder/neck pain  · You may continue on Flexeril 1 or 2 tablets at bedtime as needed for painful back spasm  · Please contact [facility]opedic group to set up you consultation  · Please proceed with shoulder x-rays, walk-in  · Please try Sudafed regular strength behind the counter on a as needed basis for the next few days for symptoms of eustachian tube dysfunction, please add Zyrtec or Claritin as well for the next few days      Discussed with the patient and all questioned fully answered  She will call me if any problems arise  M*Modal software was used to dictate this note  It may contain errors with dictating incorrect words/spelling   Please contact provider directly with any questions  Chief Complaint     Chief Complaint   Patient presents with    Follow-up     Neck little improvement noted    Earache     Left ear,         History of Present Illness     Patient presents for re-evaluation of chronic neck pain  She is experiencing recurrent radiation of her discomfort to the right arm  We reviewed results of recent cervical spine MRI which reveals mild noncompressive changes  Patient overall did notice improvement with previous regimen of prednisone and current treatment with p r n  Flexeril at night  She is still complaining of upper back pain that radiates to the right upper extremity  Patient is concerned about weakness of her dominant right arm  She is also experiencing symptoms of mild nasal congestion and cold  Right ear feels pressured  No fever, cough, colored mucus production or sore throat  Review of Systems   Review of Systems   Constitutional: Negative  HENT: Positive for congestion and ear pain  Respiratory: Negative  Cardiovascular: Negative  Musculoskeletal: Positive for arthralgias and neck pain  Neurological: Negative  Psychiatric/Behavioral: Negative  Active Problem List     Patient Active Problem List   Diagnosis    Asthma    Chronic neck pain    Osteoarthritis of spine with radiculopathy, cervical region       Past Medical History:  Past Medical History:   Diagnosis Date    Allergic     Anemia     Asthma     Pneumonia        Past Surgical History:  Past Surgical History:   Procedure Laterality Date    COLONOSCOPY N/A 4/29/2016    Procedure: COLONOSCOPY;  Surgeon: Iftikhar Vivas DO;  Location: [facility];   Service:        Family History:  Family History   Problem Relation Age of Onset    No Known Problems Mother     Other Father         bundle branch block; white blood cell disorder    Asthma Daughter     No Known Problems Sister     No Known Problems Maternal Grandmother     No Known Problems Maternal Grandfather     No Known Problems Paternal Grandmother     No Known Problems Paternal Grandfather     No Known Problems Sister     Asthma Daughter     No Known Problems Paternal Aunt     Kidney cancer Brother 39       Social History:  Social History     Socioeconomic History    Marital status: /Civil Union     Spouse name: Not on file    Number of children: Not on file    Years of education: Not on file    Highest education level: Not on file   Occupational History    Not on file   Social Needs    Financial resource strain: Not on file    Food insecurity:     Worry: Not on file     Inability: Not on file    Transportation needs:     Medical: Not on file     Non-medical: Not on file   Tobacco Use    Smoking status: Never Smoker    Smokeless tobacco: Never Used   Substance and Sexual Activity    Alcohol use: Yes     Comment: occasional    Drug use: No    Sexual activity: Not on file   Lifestyle    Physical activity:     Days per week: Not on file     Minutes per session: Not on file    Stress: Not on file   Relationships    Social connections:     Talks on phone: Not on file     Gets together: Not on file     Attends Yazidi service: Not on file     Active member of club or organization: Not on file     Attends meetings of clubs or organizations: Not on file     Relationship status: Not on file    Intimate partner violence:     Fear of current or ex partner: Not on file     Emotionally abused: Not on file     Physically abused: Not on file     Forced sexual activity: Not on file   Other Topics Concern    Not on file   Social History Narrative    , works full time           Objective     Vitals:    12/31/19 1127   Pulse: 78   Resp: 16   Temp: (!) 96 3 °F (35 7 °C)   TempSrc: Tympanic   Weight: 83 5 kg (184 lb)   Height: 5' 6 5" (1 689 m)     Wt Readings from Last 3 Encounters:   12/31/19 83 5 kg (184 lb)   12/09/19 84 6 kg (186 lb 6 4 oz)   12/08/19 83 5 kg (184 lb)       Physical Exam   Constitutional: She appears well-developed and well-nourished  HENT:   Head: Normocephalic and atraumatic  Right Ear: Tympanic membrane normal    Left Ear: Tympanic membrane normal    Nose: Mucosal edema present  Mouth/Throat: No posterior oropharyngeal erythema  Neck: Normal range of motion  Cardiovascular: Normal rate and regular rhythm  No murmur heard  Pulmonary/Chest: Effort normal and breath sounds normal  No respiratory distress  Musculoskeletal:        Right shoulder: She exhibits decreased range of motion (Painful extension and abduction and at  degrees, patient is able to achieve nearly full range of motion but with pain) and tenderness (Posterior shoulder )  She exhibits no bony tenderness and no crepitus  Left shoulder: She exhibits normal range of motion and no tenderness  Neck:  Paraspinal cervical and trapezius spasm right more than left     Psychiatric: She has a normal mood and affect  Her behavior is normal    Nursing note and vitals reviewed        Pertinent Laboratory/Diagnostic Studies:  Lab Results   Component Value Date    BUN 17 06/18/2019    CREATININE 0 89 06/18/2019    CALCIUM 8 9 06/18/2019     02/03/2016    K 4 0 06/18/2019    CO2 28 06/18/2019     06/18/2019     Lab Results   Component Value Date    ALT 15 06/18/2019    AST 15 06/18/2019    ALKPHOS 51 06/18/2019    BILITOT 0 3 02/03/2016       Lab Results   Component Value Date    WBC 4 5 06/18/2019    HGB 12 5 06/18/2019    HCT 37 6 06/18/2019    MCV 91 3 06/18/2019     06/18/2019       Lab Results   Component Value Date    TSH 1 05 06/18/2019       Lab Results   Component Value Date    CHOL 205 (H) 02/03/2016     Lab Results   Component Value Date    TRIG 64 06/18/2019     Lab Results   Component Value Date    HDL 60 06/18/2019     No results found for: LDLCALC  No results found for: HGBA1C    Results for orders placed or performed in visit on 06/18/19   Lipid Panel with Direct LDL reflex   Result Value Ref Range    Total Cholesterol 190 <200 mg/dL    HDL 60 >50 mg/dL    Triglycerides 64 <150 mg/dL    LDL Direct 114 (H) mg/dL (calc)    Chol HDLC Ratio 3 2 <5 0 (calc)    Non-HDL Cholesterol 130 (H) <130 mg/dL (calc)   Comprehensive metabolic panel   Result Value Ref Range    Glucose, Random 86 65 - 99 mg/dL    BUN 17 7 - 25 mg/dL    Creatinine 0 89 0 50 - 1 05 mg/dL    eGFR Non  71 > OR = 60 mL/min/1 73m2    eGFR  83 > OR = 60 mL/min/1 73m2    SL AMB BUN/CREATININE RATIO NOT APPLICABLE 6 - 22 (calc)    Sodium 139 135 - 146 mmol/L    Potassium 4 0 3 5 - 5 3 mmol/L    Chloride 107 98 - 110 mmol/L    CO2 28 20 - 32 mmol/L    SL AMB CALCIUM 8 9 8 6 - 10 4 mg/dL    Protein, Total 6 1 6 1 - 8 1 g/dL    Albumin 3 8 3 6 - 5 1 g/dL    Globulin 2 3 1 9 - 3 7 g/dL (calc)    Albumin/Globulin Ratio 1 7 1 0 - 2 5 (calc)    TOTAL BILIRUBIN 0 3 0 2 - 1 2 mg/dL    Alkaline Phosphatase 51 33 - 130 U/L    AST 15 10 - 35 U/L    ALT 15 6 - 29 U/L   CBC and differential   Result Value Ref Range    White Blood Cell Count 4 5 3 8 - 10 8 Thousand/uL    Red Blood Cell Count 4 12 3 80 - 5 10 Million/uL    Hemoglobin 12 5 11 7 - 15 5 g/dL    HCT 37 6 35 0 - 45 0 %    MCV 91 3 80 0 - 100 0 fL    MCH 30 3 27 0 - 33 0 pg    MCHC 33 2 32 0 - 36 0 g/dL    RDW 12 7 11 0 - 15 0 %    Platelet Count 876 776 - 400 Thousand/uL    SL AMB MPV 10 6 7 5 - 12 5 fL    Neutrophils (Absolute) 2,129 1,500 - 7,800 cells/uL    Lymphocytes (Absolute) 1,760 850 - 3,900 cells/uL    Monocytes (Absolute) 342 200 - 950 cells/uL    Eosinophils (Absolute) 252 15 - 500 cells/uL    Basophils ABS 18 0 - 200 cells/uL    Neutrophils 47 3 %    Lymphocytes 39 1 %    Monocytes 7 6 %    Eosinophils 5 6 %    Basophils PCT 0 4 %   TSH, 3rd generation   Result Value Ref Range    TSH 1 05 0 40 - 4 50 mIU/L   Vitamin D 25 hydroxy   Result Value Ref Range    Vitamin D, 25-Hydroxy, Serum 29 (L) 30 - 100 ng/mL       Orders Placed This Encounter   Procedures    XR shoulder 2+ vw right    XR acromioclavicular joints bilateral w wo weights    Ambulatory referral to Orthopedic Surgery       ALLERGIES:  Allergies   Allergen Reactions    Fluticasone-Salmeterol GI Intolerance     Category: Adverse Reaction;     Milk-Related Compounds Facial Swelling and Lip Swelling    Mometasone Furo-Formoterol Fum GI Intolerance     Category: Adverse Reaction;     Other Sneezing     Animal dander, pollen, and seasonal        Current Medications     Current Outpatient Medications   Medication Sig Dispense Refill    albuterol (2 5 mg/3 mL) 0 083 % nebulizer solution Take 3 mL (2 5 mg total) by nebulization every 4 (four) hours as needed for wheezing or shortness of breath 150 mL 5    albuterol (PROVENTIL HFA,VENTOLIN HFA) 90 mcg/act inhaler 2 puffs QID PRN,Patient prefers Ventolin brand 1 Inhaler 5    budesonide-formoterol (SYMBICORT) 160-4 5 mcg/act inhaler Inhale 2 puffs 2 (two) times a day (Patient taking differently: Inhale 2 puffs daily  ) 10 2 Inhaler 11    Cholecalciferol (VITAMIN D-3 PO) Take 1,000 mg by mouth daily      Collagenase POWD Take 1 Dose by mouth daily      cyclobenzaprine (FLEXERIL) 5 mg tablet Take 1 or 2 tablets at bedtime as needed for painful neck spasm 60 tablet 0    Magnesium Ascorbate POWD 0 5 Doses by Does not apply route daily      celecoxib (CeleBREX) 200 mg capsule Take 1 capsule once a day after food as needed for shoulder pain 30 capsule 1     No current facility-administered medications for this visit          Medications Discontinued During This Encounter   Medication Reason    predniSONE 10 mg tablet Therapy completed    naproxen (EC NAPROSYN) 500 MG EC tablet Therapy completed       Health Maintenance     Health Maintenance   Topic Date Due    Hepatitis C Screening  1960    Pneumococcal Vaccine: Pediatrics (0 to 5 Years) and At-Risk Patients (6 to 59 Years) (1 of 1 - PPSV23) 02/22/1967    HIV Screening  02/22/1976    BMI: Followup Plan  02/04/2020    Depression Screening PHQ  12/31/2020    BMI: Adult  12/31/2020    MAMMOGRAM  06/28/2021    Cervical Cancer Screening  02/19/2022    Pneumococcal Vaccine: 65+ Years (1 of 2 - PCV13) 02/22/2026    CRC Screening: Colonoscopy  04/29/2026    DTaP,Tdap,and Td Vaccines (2 - Td) 04/18/2028    Influenza Vaccine  Completed    HIB Vaccine  Aged Out    Hepatitis B Vaccine  Aged Out    IPV Vaccine  Aged Out    Hepatitis A Vaccine  Aged Out    Meningococcal ACWY Vaccine  Aged Out    HPV Vaccine  Aged Out       Immunization History   Administered Date(s) Administered     Influenza (IM) Preservative Free 12/31/2018    Fluzone Split Quad 0 25 mL 10/28/2019    INFLUENZA 12/31/2018    Tdap 04/18/2018       Nakul Arellano MD

## 2020-01-08 ENCOUNTER — TELEPHONE (OUTPATIENT)
Dept: FAMILY MEDICINE CLINIC | Facility: CLINIC | Age: 60
End: 2020-01-08

## 2020-01-09 NOTE — TELEPHONE ENCOUNTER
Please contact patient    Shoulder x-ray was normal   Please advise her to keep follow-up with Orthopedic surgeon later this month as scheduled

## 2020-01-26 DIAGNOSIS — M54.2 NECK PAIN: ICD-10-CM

## 2020-01-26 RX ORDER — CYCLOBENZAPRINE HCL 5 MG
TABLET ORAL
Qty: 60 TABLET | Refills: 0 | Status: SHIPPED | OUTPATIENT
Start: 2020-01-26 | End: 2020-11-05

## 2020-01-27 ENCOUNTER — CONSULT (OUTPATIENT)
Dept: OBGYN CLINIC | Facility: HOSPITAL | Age: 60
End: 2020-01-27
Payer: COMMERCIAL

## 2020-01-27 VITALS
BODY MASS INDEX: 28.25 KG/M2 | WEIGHT: 180 LBS | DIASTOLIC BLOOD PRESSURE: 83 MMHG | HEIGHT: 67 IN | SYSTOLIC BLOOD PRESSURE: 134 MMHG | HEART RATE: 80 BPM

## 2020-01-27 DIAGNOSIS — M25.511 ACUTE PAIN OF RIGHT SHOULDER: ICD-10-CM

## 2020-01-27 DIAGNOSIS — M75.41 IMPINGEMENT SYNDROME OF RIGHT SHOULDER: Primary | ICD-10-CM

## 2020-01-27 DIAGNOSIS — M54.12 RIGHT CERVICAL RADICULOPATHY: ICD-10-CM

## 2020-01-27 DIAGNOSIS — M75.42 SUBACROMIAL IMPINGEMENT OF LEFT SHOULDER: ICD-10-CM

## 2020-01-27 PROCEDURE — 3008F BODY MASS INDEX DOCD: CPT | Performed by: ORTHOPAEDIC SURGERY

## 2020-01-27 PROCEDURE — 99243 OFF/OP CNSLTJ NEW/EST LOW 30: CPT | Performed by: ORTHOPAEDIC SURGERY

## 2020-01-27 NOTE — PROGRESS NOTES
I personally examined the patient and reviewed the history provided  I agree with the note and the assessment and plan by Dr Estella Avila MD      Briefly the patient is a 63 y o  female with a chief complaint of bilateral, right worse than left shoulder pain who presents to the office for evaluation and treatment  Please refer to the documented HPI in the body of the note for details  Patient was seen by her primary care physician Dr Joseph Ford on December 31, 2019 and sent for orthopedic surgical consultation to see me regarding this chief complaint  Patient also has cervical radiculopathy complaints which have not been addressed with physical therapy at this time    Physical Exam: Blood pressure 134/83, pulse 80, height 5' 6 5" (1 689 m), weight 81 6 kg (180 lb), not currently breastfeeding  Bilateral shoulders full range of motion  Bilateral shoulders 4 to 5 abduction and external rotation strength testing which does cause pain  Positive Bruno impingement sign right worse than left    Radiology: I have personally reviewed the following images and my read follows  Radiographs right shoulder show no acute bony abnormality  Bilateral acromioclavicular joint x-rays show mild age-related degenerative change with no acute abnormality    Assessment:    Bilateral subacromial impingement syndrome right worse than left    Cervical radiculopathy right side    Plan:    The patient has an examination consistent with subacromial impingement syndrome of the bilateral shoulder  I have discussed with the patient the pathophysiology of this diagnosis and reviewed how the examination correlates with this diagnosis  Treatment options were discussed at length and after discussing these treatment options, the patient was provided with a prescription for referral to physical therapy  We will reevaluate the patient in 6-8 weeks    If the symptoms fail to improve with this treatment the patient would be indicated for further imaging in the form of an MRI scan of the shoulder  The use of subacromial corticosteroid injection was also discussed but the patient politely declined at this time, she may contact the office for an injection if necessary in the future  As for the cervical radiculopathy the patient would benefit from referral to therapy so this was included and a referral to the [facility] was also provided as the patient was clearly told that the symptoms in her hand and arm would not likely improve without treatment of her cervical spine  Assessment  Diagnoses and all orders for this visit:    Impingement syndrome of right shoulder  -     Ambulatory referral to Physical Therapy; Future    Acute pain of right shoulder  -     Ambulatory referral to Orthopedic Surgery    Right cervical radiculopathy  -     Ambulatory referral to Pain Management; Future        Discussion and Plan:  WBAT RUE  Will provide referral for PT as well as spine and pain management for radicular symptoms  Continue NSAIDS as needed for further pain relief  Will see patient back in 6 weeks for evaluation following initiation of PT  If patient has pain with therapy sessions and would desire a CSI to aid in participation with PT will see patient sooner for injection  Subjective:   Patient ID: Jemma Short is a 63 y o  female      complaining of right shoulder pain  The pain began 3 month(s) ago and is not associated with an acute injury  The patient describes the pain as sharp in quality,  intermittent in timing, and localizes the pain to the  right subacromial joint  The pain is worse with overhead work and relieved by rest   The pain is associated with numbness and tingling on occasion that she will feel extending from her neck to her hand  The pain is not associated with constitutional symptoms  The patient is not awoken at night by the pain    The patient has had over the counter pain medication and acupuncture for treatment  The following portions of the patient's history were reviewed and updated as appropriate: allergies, current medications, past family history, past medical history, past social history, past surgical history and problem list     Review of Systems   All other systems reviewed and are negative  Objective:  /83   Pulse 80   Ht 5' 6 5" (1 689 m)   Wt 81 6 kg (180 lb) Comment: verbal  BMI 28 62 kg/m²       Right Shoulder Exam     Range of Motion   Active abduction: 170   External rotation: 70   Forward flexion: 180   Internal rotation 0 degrees: T4     Muscle Strength   Abduction: 5/5   Internal rotation: 5/5   External rotation: 5/5   Supraspinatus: 5/5   Subscapularis: 5/5   Biceps: 5/5     Tests   Bruno test: positive  Impingement: positive  Drop arm: negative    Other   Erythema: absent  Sensation: normal  Pulse: present            Physical Exam   Constitutional: She is oriented to person, place, and time  She appears well-developed and well-nourished  HENT:   Head: Normocephalic and atraumatic  Eyes: Pupils are equal, round, and reactive to light  EOM are normal    Cardiovascular: Normal rate and intact distal pulses  Pulmonary/Chest: Effort normal  No respiratory distress  Neurological: She is alert and oriented to person, place, and time  Skin: Skin is warm and dry  Psychiatric: She has a normal mood and affect  Her behavior is normal          I have personally reviewed pertinent films in PACS and my interpretation is as follows      XR right shoulder shows no significant degenerative changes, fractures, or dislocations

## 2020-01-28 ENCOUNTER — EVALUATION (OUTPATIENT)
Dept: PHYSICAL THERAPY | Facility: REHABILITATION | Age: 60
End: 2020-01-28
Payer: COMMERCIAL

## 2020-01-28 DIAGNOSIS — M75.41 IMPINGEMENT SYNDROME OF RIGHT SHOULDER: ICD-10-CM

## 2020-01-28 DIAGNOSIS — M54.9 UPPER BACK PAIN: Primary | ICD-10-CM

## 2020-01-28 PROCEDURE — 97162 PT EVAL MOD COMPLEX 30 MIN: CPT | Performed by: PHYSICAL THERAPIST

## 2020-01-28 PROCEDURE — 97112 NEUROMUSCULAR REEDUCATION: CPT | Performed by: PHYSICAL THERAPIST

## 2020-01-28 NOTE — PROGRESS NOTES
PT Evaluation     Today's date: 2020  Patient name: Olu Garcia  : 1960  MRN: [id number]  Referring provider: Aida Flores MD  Dx:   Encounter Diagnosis     ICD-10-CM    1  Upper back pain M54 9    2  Impingement syndrome of right shoulder M75 41 Ambulatory referral to Physical Therapy       Start Time: 1700  Stop Time: 1800  Total time in clinic (min): 60 minutes    Assessment  Assessment details: Olu Garcia is a 63 y o  female who presents to therapy for R sided neck/shoulder pain  MRI of the neck shows mild, multilevel degenerative changes  Examination findings include decreased R scapular strength, C6 myotomal weakness, RUE n/t, and hypertonicity of the upper thoracic spine and periscapular muscular  These impairments are affecting ADLs, iADLs, and work duties as an   Pt will benefit from skilled outpatient PT to address the below stated goals to reduce pain and improve shoulder function to better manage ADLs and work/household duties  Therapist explained to pt: findings of IE, rehab diagnosis, and POC  Pt-centered goals reviewed and confirmed by pt  Instruction also given for HEP  Pt expressed verbal agreement and understanding and verified understanding via teach back method  Pt also expressed satisfaction that their current concerns were addressed at the end of the session  Impairments: abnormal or restricted ROM, activity intolerance, impaired physical strength, lacks appropriate home exercise program, pain with function and poor posture     Symptom irritability: lowBarriers to therapy: None   Understanding of Dx/Px/POC: good   Prognosis: good    Goals  Short term goals: to be met in 2 weeks  Pt will report at least a 25% reduction in subjective pain complaints/symptoms to manage ADLs with reduced pain  Pt independent with initial HEP, rationale, technique and frequency, for ROM and pain control      Long term goals: to be met in 4 weeks  Pt will report at least a 75% reduction in subjective pain complaints/symptoms to manage ADLs with reduced pain  Eliminate UE n/t indicating return to PLOF  Pt will have full and pain free shoulder ROM to be able to perform ADLs and work duties  Pt will be able to drive without shoulder 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  Pt will improve FOTO score to better then expected outcome indicating an overall improvement in pain and function   Improve shoulder/scapular strength to >4/5 for improved joint protection and ease over lifting, reaching, and overhead activities  Pt will be independent in final HEP for transition to maintenance program  Achieve pts therapy goal: no numbness      Plan  Patient would benefit from: skilled physical therapy  Planned modality interventions: TENS, thermotherapy: hydrocollator packs, traction, ultrasound, cryotherapy and low level laser therapy  Planned therapy interventions: body mechanics training, therapeutic training, therapeutic exercise, therapeutic activities, stretching, strengthening, neuromuscular re-education, patient education, home exercise program, functional ROM exercises, flexibility, manual therapy, Xiao taping and joint mobilization  Frequency: 2-3x/week  Duration in weeks: 8  Treatment plan discussed with: patient        Subjective Evaluation    History of Present Illness  Mechanism of injury: Pt presents with neck and R shoulder pain  X ray of the shoulders and AC joints are normal  MRI of the neck shows mild, multilevel degenerative change, noncompressive  Pt states she was having neck pain up until January but this has since resolved  Started accupuncture and this has improved  Pt states this improved the RUE tingling  Pt currently reports subtle pain the neck and pain in the R arm pit region  Still getting tingling in the R hand, but unsure what is causing it  Does not typically wake with n/t  Sleeping with arm above head had relieved the n/t   Pt states she sees a massage therapist which helps  Denies bowel/bladder changes  Denies any UE weakness  Pt is R hand dominant  Pain all started back in Oct with no known cause and pain was initially intermittent  Then the pain started to increase more  Pain increases with sitting, lifting items, using the R arm  Pain improves with arm over the head/elbow up, massage and yoga  Pt states she really is no longer having pain, but is still having tingling in the RUE  Will get tingling in the whole hand or in the last 2 digits  No LUE sx's          Not a recurrent problem   Quality of life: excellent    Pain  Current pain ratin (tingling in T last 2 digits)  At best pain ratin  At worst pain ratin  Location: upper back/lower neck, R arm pit  Quality: tingling  Progression: improved    Social Support    Employment status: working ([employer] work or out on site, standing, walking driving)  Hand dominance: right      Diagnostic Tests  X-ray: normal  MRI studies: normal  Treatments  Previous treatment: massage and medication (acupuncture)  Current treatment: massage and medication  Patient Goals  Patient goals for therapy: independence with ADLs/IADLs, return to sport/leisure activities, decreased pain and increased motion  Patient goal: no numbness        Objective     Neurological Testing     Sensation   Cervical/Thoracic   Left   Intact: light touch    Right   Diminished: light touch    Comments   Right light touch: dec R 5th digit       Reflexes   Left   Biceps (C5/C6): normal (2+)  Brachioradialis (C6): normal (2+)    Right   Biceps (C5/C6): normal (2+)  Brachioradialis (C6): normal (2+)    Strength/Myotome Testing     Left Shoulder     Planes of Motion   Abduction: 5     Isolated Muscles   Lower trapezius: 4-   Middle trapezius: 4-     Right Shoulder     Planes of Motion   Abduction: 5     Isolated Muscles   Lower trapezius: 3-   Middle trapezius: 3-     Left Elbow   Flexion: 5  Extension: 5    Right Elbow Flexion: 4+  Extension: 5    Left Wrist/Hand   Wrist extension: 5  Wrist flexion: 5  Thumb extension: 5    Right Wrist/Hand   Wrist extension: 4  Wrist flexion: 5  Thumb extension: 5      strength:  R = 52 lbs  L = 59 lbs    ROM  Neck AROM WNL and pain free  Shoulder AROM WNL and pain free except mild pain with end range functional IR on the R    Palpation  TTP C6 spinous process  TTP over R mid trap    Special testing  (-) compress/distraction test  (-) spurlings test    (+) ULTT median, radial, and ulnar biases on the R, (-) all biases on the L       Precautions: asthma    FOTO  1/28/2020 =  48/68    Manual  1/28                         Pa t-spine mobs             C/s distraction             C/s glides                              Exercise Diary  1/28            UBE retro NV            Prone horiz abd 5" 2x10            Prone scaption 5" 2x10            tband row with ER GTB 5" 2x10            Tband shoulder ext GTB 5" 2x10            Tband scaption GTB 5" 2x10            Mechanical traction trial             Repeated motions testing trial             RUE nerve glides - med, radial, and ulnar NV                                                                                                                                                               Modalities

## 2020-01-31 ENCOUNTER — OFFICE VISIT (OUTPATIENT)
Dept: PHYSICAL THERAPY | Facility: REHABILITATION | Age: 60
End: 2020-01-31
Payer: COMMERCIAL

## 2020-01-31 DIAGNOSIS — M54.9 UPPER BACK PAIN: ICD-10-CM

## 2020-01-31 DIAGNOSIS — M75.41 IMPINGEMENT SYNDROME OF RIGHT SHOULDER: Primary | ICD-10-CM

## 2020-01-31 PROCEDURE — 97140 MANUAL THERAPY 1/> REGIONS: CPT | Performed by: PHYSICAL THERAPIST

## 2020-01-31 PROCEDURE — 97112 NEUROMUSCULAR REEDUCATION: CPT | Performed by: PHYSICAL THERAPIST

## 2020-01-31 NOTE — PROGRESS NOTES
Daily Note     Today's date: 2020  Patient name: Yessy Zabala  : 1960  MRN: [id number]  Referring provider: Cris Patton MD  Dx:   Encounter Diagnosis     ICD-10-CM    1  Impingement syndrome of right shoulder M75 41    2  Upper back pain M54 9        Start Time: 1315  Stop Time: 1405  Total time in clinic (min): 50 minutes    Subjective: Pt states she was sore in the upper back and neck and a little tight from the exercises  No change in n/t  Went to yoga and had increased n/t with holding arms in warrior pose and laying supine on hard surface  Able to do downward dog without any issue  States she also trialed mechanical traction of friends unit and states it felt good but has not improved her n/t  Objective: See treatment diary below  Repeated c/s retract supine - dec sx's  Manual c/s retract in supine - increase in RUE n/t  Manual c/s retract with overpressure - increase in RUE n/t    (+) Garcia stress test on R       Assessment: Trialed repeated supine c/s retract; Pt noted mild improvement in RUE n/t  Trialed manual c/s retract without and with overpressure and RUE sx's significantly increased  Sx's seem to be more consistent with thoracic outlet syndrome  Pt has a positive Garcia stress test on the R and RUE n/t increased with R first rib mobilizations  Pt also had increased n/t in the RUE with PA upper thoracic mobs, but n/t reduced with R lateral glides to the CT junction  Updated HEP to include RUE nerve glides and advised pt to perform scap strengthening with a flexed elbow to limit UE neural tension  Plan: Continue per plan of care  Progress treatment as tolerated  Focus on reducing RUE n/t  Assess R shoulder mobility and pec tightness       Precautions: asthma    FOTO  2020 =  48/68    Manual               Assess x15'           Pa t-spine mobs  DS Gr III           C/s distraction  DS           C/s lateral glides  DS - Gr II-III C2-C6           STM scalenes on R DS                                                      Exercise Diary  1/28 1/31           UBE retro NV            Prone horiz abd 5" 2x10            Prone scaption 5" 2x10 hold           tband row with ER GTB 5" 2x10            Tband shoulder ext GTB 5" 2x10            Tband scaption GTB 5" 2x10            Mechanical traction trial  Pt trialed on own           Repeated motions testing trial  pefomred           RUE nerve glides - med, radial, and ulnar NV x5 each           Pec stretch  NV                                                                                                                                                 Modalities

## 2020-02-03 ENCOUNTER — OFFICE VISIT (OUTPATIENT)
Dept: PHYSICAL THERAPY | Facility: REHABILITATION | Age: 60
End: 2020-02-03
Payer: COMMERCIAL

## 2020-02-03 DIAGNOSIS — M54.9 UPPER BACK PAIN: ICD-10-CM

## 2020-02-03 DIAGNOSIS — M75.41 IMPINGEMENT SYNDROME OF RIGHT SHOULDER: Primary | ICD-10-CM

## 2020-02-03 PROCEDURE — 97110 THERAPEUTIC EXERCISES: CPT | Performed by: PHYSICAL THERAPIST

## 2020-02-03 PROCEDURE — 97112 NEUROMUSCULAR REEDUCATION: CPT | Performed by: PHYSICAL THERAPIST

## 2020-02-03 PROCEDURE — 97140 MANUAL THERAPY 1/> REGIONS: CPT | Performed by: PHYSICAL THERAPIST

## 2020-02-03 NOTE — PROGRESS NOTES
Daily Note     Today's date: 2/3/2020  Patient name: Abdirashid Castillo  : 1960  MRN: [id number]  Referring provider: Tobias Rocha MD  Dx:   Encounter Diagnosis     ICD-10-CM    1  Impingement syndrome of right shoulder M75 41    2  Upper back pain M54 9                   Subjective: Continues to experience RUE tingling, localized to pinky finger and sometimes into forearm  Objective: See treatment diary below    Assessment: Pec tightness noted R>L during pec stretching  Minor increase in R hand tingling during pec stretching which subsided after completion of exercise  No change in symptoms w/ manual techniques  She would benefit from continued PT services to reduce pain and increase level of function  Plan: Continue per plan of care  Progress treatment as tolerated         Precautions: asthma    FOTO  2020 =  48/68    Manual   2/3            Assess x15'           Pa t-spine mobs  DS Gr III MM Gr III          C/s distraction  DS MM          C/s lateral glides  DS - Gr II-III C2-C6 MM Gr II-III C2-6          STM scalenes on R  DS                                                      Exercise Diary   2/3          UBE retro NV            Prone horiz abd 5" 2x10            Prone scaption 5" 2x10 hold           tband row with ER GTB 5" 2x10            Tband shoulder ext GTB 5" 2x10            Tband scaption GTB 5" 2x10            Mechanical traction trial  Pt trialed on own           Repeated motions testing trial  pefomred           RUE nerve glides - med, radial, and ulnar NV x5 each Reviewed           Pec stretch  NV Doorway 4x15", HEP          Butterfly pec stretch   W/ OP 7x10"          90/90 horiz abd @ wall   2x8x2"                                                                                                                      Modalities

## 2020-02-06 ENCOUNTER — OFFICE VISIT (OUTPATIENT)
Dept: PHYSICAL THERAPY | Facility: REHABILITATION | Age: 60
End: 2020-02-06
Payer: COMMERCIAL

## 2020-02-06 DIAGNOSIS — M75.41 IMPINGEMENT SYNDROME OF RIGHT SHOULDER: Primary | ICD-10-CM

## 2020-02-06 DIAGNOSIS — M54.9 UPPER BACK PAIN: ICD-10-CM

## 2020-02-06 PROCEDURE — 97110 THERAPEUTIC EXERCISES: CPT | Performed by: PHYSICAL THERAPIST

## 2020-02-06 PROCEDURE — 97140 MANUAL THERAPY 1/> REGIONS: CPT | Performed by: PHYSICAL THERAPIST

## 2020-02-06 NOTE — PROGRESS NOTES
[facility]     [facility]    [place] [address]    Phone:  [phone]    Fax:  [phone]           Divya Black   MRN: [id number]    Department:  [facility]   Date of Visit:  1/6/2017           Diagnosis     Scalp laceration, initial encounter        You were seen by JOSEPH Rodriguez.      Disclaimer     Follow-up Care:  It is your responsibility to arrange for follow-up care with your healthcare provider or as instructed. Call to get an appointment time.           Contact your doctor for follow-up appointment if not already scheduled.     Follow up with Esteban Cortes MD. Schedule an appointment as soon as possible for a visit in 3 days.    Specialty:  Family Practice    Contact information    [address] [place] [address] [address] [place] [address]  [phone]        Medications you received while in the ED through 01/06/2017  8:34 PM     Date/Time Order Dose Route Action    01/06/2017  8:12 PM lidocaine 4 %-EPINEPHRine 0.05 % (L.E.) (compounded) topical gel 2 mL 2 mL Topical Given         What to Do with Your Medications      Notice     No changes were made to your prescriptions during this visit.            Procedures     None      Imaging Results     None        Discharge Instructions       Staples to be removed in 7 days  Monitor for redness, swelling, drainage or fevers, if this occurs then have it looked at by healthcare professional  Wound check in 2 days  Use tylenol as needed for pain          Laceration, Scalp: Sutures or Staples  A laceration is a cut through the skin.  Laceration of the scalp may require stitches (sutures) or staples. There are a lot of blood vessels present in the scalp. Therefore, significant bleeding is common with scalp cuts.  Home care  The following guidelines will help you care for your laceration at home:  · During the first two days you may carefully rinse your hair in the shower to remove blood, glass or dirt particles. After two days you may shower and  Daily Note     Today's date: 2020  Patient name: Luna Carbajal  : 1960  MRN: [id number]  Referring provider: Smiley Mondragon MD  Dx:   Encounter Diagnosis     ICD-10-CM    1  Impingement syndrome of right shoulder M75 41    2  Upper back pain M54 9        Start Time: 1617  Stop Time: 1658  Total time in clinic (min): 41 minutes    Subjective: Pt states she is doing much better and the nerve glides has significantly helped  States she had almost no n/t sx's today after work  States she is going to cancel her spine consultation b/c she is doing so well  Objective: See treatment diary below    Assessment: Pt presents with improving radicular sx's in the RUE  Added R SCM and scalene stretch to program and updated to include in HEP  Pt still has a positive ULTT median nerve bias and increase in RUE n/t with PA mobs over the CTJ but sx's reduced with lateral glides to the CTJ  Pt will benefit from continued skilled outpatient PT to improve strength, to address therapy goals, to reduce pain, and improve function  Plan: Continue per plan of care  Progress treatment as tolerated         Precautions: asthma    FOTO  2020 =  59/68  2020= 55/68    Manual   2/3 2/6           Assess x15'           Pa t-spine mobs  DS Gr III MM Gr III DS Gr III 8'         C/s distraction  DS MM DS 8'         C/s lateral glides  DS - Gr II-III C2-C6 MM Gr II-III C2-6          STM scalenes on R  DS           R first rib inf mob    Gr III DS 30"x4         Lateral mobs to CTJ    DS 5'         RUE nerve glides - m, u, r     x20 each             Exercise Diary   2/3 2/6         UBE retro NV            Prone horiz abd 5" 2x10            Prone scaption 5" 2x10 hold           tband row with ER GTB 5" 2x10            Tband shoulder ext GTB 5" 2x10            Tband scaption GTB 5" 2x10            Mechanical traction trial  Pt trialed on own           Repeated motions testing trial  pefomred           RUE nerve glides - med, radial, and ulnar NV x5 each Reviewed           Pec stretch  NV Doorway 4x15", HEP 30"x4         Butterfly pec stretch   W/ OP 7x10"          90/90 horiz abd @ wall   2x8x2"          R SCM stretch    30"x4         R scalene stretch    30"x4         1/2 foam roll series    1'x5                                                                                Modalities shampoo your hair normally.  · Have someone help you clean your wound every day:  ¨ In the shower, wash the area with soap and water. Use a wet cotton swab to loosen and remove any blood or crust that forms.  ¨ After cleaning, keep the wound clean and dry. Talk with your doctor before applying any antibiotic ointment to the wound. Reapply a fresh bandage.  · Do not put your head under water (no swimming) until the stitches or staples have been removed.  · The healthcare provider may prescribe an antibiotic cream or ointment to prevent infection. Do not stop taking this medication until you have finished the prescribed course or the provider tells you to stop.   · The healthcare provider may prescribe medications for pain. Follow instructions for taking these medications.  Follow-up care  Follow up with your healthcare provider. Check the wound daily for the signs of infection listed below. Stitches or staples are typically removed from the scalp in about 7 to 14 days.  When to seek medical care  Get prompt medical attention if any of these occur:  · Signs of infection, including increasing pain in the wound, redness, swelling, or pus coming from the wound  · Fever of 100.4ºF (38ºC) or higher, or as directed by your health care provider  · Stitches or staples come apart or fall out before your next appointment  · Wound edges re-open  · Bleeding can't be controlled by direct pressure  © 3947-6411 [employer]. [address] [place] [address]. All rights reserved. This information is not intended as a substitute for professional medical care. Always follow your healthcare professional's instructions.          Discharge References/Attachments     None

## 2020-02-10 ENCOUNTER — OFFICE VISIT (OUTPATIENT)
Dept: PHYSICAL THERAPY | Facility: REHABILITATION | Age: 60
End: 2020-02-10
Payer: COMMERCIAL

## 2020-02-10 DIAGNOSIS — M75.41 IMPINGEMENT SYNDROME OF RIGHT SHOULDER: Primary | ICD-10-CM

## 2020-02-10 DIAGNOSIS — M54.9 UPPER BACK PAIN: ICD-10-CM

## 2020-02-10 PROCEDURE — 97112 NEUROMUSCULAR REEDUCATION: CPT | Performed by: PHYSICAL THERAPIST

## 2020-02-10 PROCEDURE — 97140 MANUAL THERAPY 1/> REGIONS: CPT | Performed by: PHYSICAL THERAPIST

## 2020-02-10 PROCEDURE — 97110 THERAPEUTIC EXERCISES: CPT | Performed by: PHYSICAL THERAPIST

## 2020-02-10 NOTE — PROGRESS NOTES
PT Re-Evaluation  and PT Discharge    Today's date: 2/10/2020  Patient name: Abdirashid Castillo  : 1960  MRN: [id number]  Referring provider: Tobias Rocha MD  Dx:   Encounter Diagnosis     ICD-10-CM    1  Impingement syndrome of right shoulder M75 41    2  Upper back pain M54 9        Start Time: 1008  Stop Time: 1041  Total time in clinic (min): 33 minutes    Assessment  Assessment details: Pt presents today for 5th therapy visit  At this time the pt has met all therapy goals and feels 95% improvement of sx's  Shoulder AROM is full and pain free, UE strength has improved, and pain has reduced to  0/10  Pt is pleased with progress and independent in HEP  Pt should continue to improve on own with home program  Pt thanked therapist and feels ready for discharge from therapy  Goals  Short term goals: to be met in 2 weeks  Pt will report at least a 25% reduction in subjective pain complaints/symptoms to manage ADLs with reduced pain  MET  Pt independent with initial HEP, rationale, technique and frequency, for ROM and pain control  MET    Long term goals: to be met in 4 weeks  Pt will report at least a 75% reduction in subjective pain complaints/symptoms to manage ADLs with reduced pain  MET  Eliminate UE n/t indicating return to PLOF MET  Pt will have full and pain free shoulder ROM to be able to perform ADLs and work duties  MET  Pt will be able to drive without shoulder 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  MET  Pt will improve FOTO score to better then expected outcome indicating an overall improvement in pain and function MET  Improve shoulder/scapular strength to >4/5 for improved joint protection and ease over lifting, reaching, and overhead activities   MET  Pt will be independent in final HEP for transition to maintenance program MET  Achieve pts therapy goal: no numbness MET        Subjective Evaluation    History of Present Illness  Mechanism of injury: Pt states she feels 95% improvement of sx's   Pain  Current pain ratin  At best pain ratin  At worst pain ratin          Objective     Active Range of Motion   Left Shoulder   Flexion: WFL  Abduction: WFL  External rotation BTH: WFL  Internal rotation BTB: WFL    Right Shoulder   Flexion: WFL  Abduction: WFL  External rotation BTH: WFL  Internal rotation BTB: WFL    Strength/Myotome Testing     Left Shoulder     Isolated Muscles   Lower trapezius: 4+   Middle trapezius: 4+     Right Shoulder     Isolated Muscles   Lower trapezius: 4+   Middle trapezius: 4+     Additional Strength Details  ULTT median, ulnar and radial all negative on the RUE      Flowsheet Rows      Most Recent Value   PT/OT G-Codes   Current Score  86   Projected Score  68                Manual  1/28 1/31 2/3 2/6 2/10                 Assess x15'      Reassess             Pa t-spine mobs   DS Gr III MM Gr III DS Gr III 8'               C/s distraction   DS MM DS 8'               C/s lateral glides   DS - Gr II-III C2-C6 MM Gr II-III C2-6                 STM scalenes on R   DS                   R first rib inf mob       Gr III DS 30"x4               Lateral mobs to CTJ       DS 5'  DS 8'             RUE nerve glides - m, u, r        x20 each  x20 each                   Exercise Diary  1/28 1/31 2/3 2/6 2/10             UBE retro NV                     Prone horiz abd 5" 2x10                     Prone scaption 5" 2x10 hold                   tband row with ER GTB 5" 2x10                     Tband shoulder ext GTB 5" 2x10                     Tband scaption GTB 5" 2x10                     Mechanical traction trial   Pt trialed on own                   Repeated motions testing trial   pefomred                   RUE nerve glides - med, radial, and ulnar NV x5 each Reviewed     reviewed             Pec stretch   NV Doorway 4x15", HEP 30"x4  reviewed             Butterfly pec stretch     W/ OP 7x10"                 90/90 horiz abd @ wall     2x8x2"                 R SCM stretch       30"x4               R scalene stretch       30"x4               1/2 foam roll series       1'x5                                                                                                                                                Modalities

## 2020-02-13 ENCOUNTER — APPOINTMENT (OUTPATIENT)
Dept: PHYSICAL THERAPY | Facility: REHABILITATION | Age: 60
End: 2020-02-13
Payer: COMMERCIAL

## 2020-02-17 ENCOUNTER — APPOINTMENT (OUTPATIENT)
Dept: PHYSICAL THERAPY | Facility: REHABILITATION | Age: 60
End: 2020-02-17
Payer: COMMERCIAL

## 2020-02-27 DIAGNOSIS — M25.511 ACUTE PAIN OF RIGHT SHOULDER: ICD-10-CM

## 2020-02-27 RX ORDER — CELECOXIB 200 MG/1
CAPSULE ORAL
Qty: 30 CAPSULE | Refills: 1 | Status: SHIPPED | OUTPATIENT
Start: 2020-02-27 | End: 2020-11-05

## 2020-11-05 ENCOUNTER — OFFICE VISIT (OUTPATIENT)
Dept: FAMILY MEDICINE CLINIC | Facility: CLINIC | Age: 60
End: 2020-11-05
Payer: COMMERCIAL

## 2020-11-05 VITALS
BODY MASS INDEX: 23.63 KG/M2 | HEART RATE: 88 BPM | SYSTOLIC BLOOD PRESSURE: 120 MMHG | RESPIRATION RATE: 16 BRPM | WEIGHT: 150.56 LBS | OXYGEN SATURATION: 97 % | TEMPERATURE: 97.6 F | DIASTOLIC BLOOD PRESSURE: 84 MMHG | HEIGHT: 67 IN

## 2020-11-05 DIAGNOSIS — Z12.31 ENCOUNTER FOR SCREENING MAMMOGRAM FOR BREAST CANCER: ICD-10-CM

## 2020-11-05 DIAGNOSIS — J45.40 MODERATE PERSISTENT ASTHMA WITHOUT COMPLICATION: ICD-10-CM

## 2020-11-05 DIAGNOSIS — J45.40 MODERATE PERSISTENT ASTHMA, UNSPECIFIED WHETHER COMPLICATED: ICD-10-CM

## 2020-11-05 DIAGNOSIS — R53.83 FATIGUE, UNSPECIFIED TYPE: ICD-10-CM

## 2020-11-05 DIAGNOSIS — Z13.6 SCREENING FOR CARDIOVASCULAR CONDITION: ICD-10-CM

## 2020-11-05 DIAGNOSIS — J45.20 MILD INTERMITTENT ASTHMA WITHOUT COMPLICATION: ICD-10-CM

## 2020-11-05 DIAGNOSIS — Z00.00 ENCOUNTER FOR HEALTH MAINTENANCE EXAMINATION IN ADULT: Primary | ICD-10-CM

## 2020-11-05 DIAGNOSIS — E55.9 VITAMIN D DEFICIENCY: ICD-10-CM

## 2020-11-05 PROCEDURE — 1036F TOBACCO NON-USER: CPT | Performed by: FAMILY MEDICINE

## 2020-11-05 PROCEDURE — 3008F BODY MASS INDEX DOCD: CPT | Performed by: FAMILY MEDICINE

## 2020-11-05 PROCEDURE — 99396 PREV VISIT EST AGE 40-64: CPT | Performed by: FAMILY MEDICINE

## 2020-11-05 RX ORDER — ALBUTEROL SULFATE 2.5 MG/3ML
2.5 SOLUTION RESPIRATORY (INHALATION) EVERY 4 HOURS PRN
Qty: 150 ML | Refills: 5 | Status: SHIPPED | OUTPATIENT
Start: 2020-11-05 | End: 2022-06-12 | Stop reason: SDUPTHER

## 2020-11-05 RX ORDER — BUDESONIDE AND FORMOTEROL FUMARATE DIHYDRATE 160; 4.5 UG/1; UG/1
2 AEROSOL RESPIRATORY (INHALATION) 2 TIMES DAILY
Qty: 10.2 INHALER | Refills: 11 | Status: SHIPPED | OUTPATIENT
Start: 2020-11-05 | End: 2021-11-15

## 2020-11-05 RX ORDER — ALBUTEROL SULFATE 90 UG/1
AEROSOL, METERED RESPIRATORY (INHALATION)
Qty: 1 INHALER | Refills: 5 | Status: SHIPPED | OUTPATIENT
Start: 2020-11-05

## 2020-12-12 LAB
25(OH)D3 SERPL-MCNC: 39 NG/ML (ref 30–100)
ALBUMIN SERPL-MCNC: 4.2 G/DL (ref 3.6–5.1)
ALBUMIN/GLOB SERPL: 1.8 (CALC) (ref 1–2.5)
ALP SERPL-CCNC: 46 U/L (ref 37–153)
ALT SERPL-CCNC: 10 U/L (ref 6–29)
AST SERPL-CCNC: 13 U/L (ref 10–35)
BASOPHILS # BLD AUTO: 10 CELLS/UL (ref 0–200)
BASOPHILS NFR BLD AUTO: 0.2 %
BILIRUB SERPL-MCNC: 0.4 MG/DL (ref 0.2–1.2)
BUN SERPL-MCNC: 17 MG/DL (ref 7–25)
BUN/CREAT SERPL: NORMAL (CALC) (ref 6–22)
CALCIUM SERPL-MCNC: 9.5 MG/DL (ref 8.6–10.4)
CHLORIDE SERPL-SCNC: 106 MMOL/L (ref 98–110)
CHOLEST SERPL-MCNC: 194 MG/DL
CHOLEST/HDLC SERPL: 2.8 (CALC)
CO2 SERPL-SCNC: 29 MMOL/L (ref 20–32)
CREAT SERPL-MCNC: 0.89 MG/DL (ref 0.5–1.05)
EOSINOPHIL # BLD AUTO: 78 CELLS/UL (ref 15–500)
EOSINOPHIL NFR BLD AUTO: 1.6 %
ERYTHROCYTE [DISTWIDTH] IN BLOOD BY AUTOMATED COUNT: 12.1 % (ref 11–15)
GLOBULIN SER CALC-MCNC: 2.4 G/DL (CALC) (ref 1.9–3.7)
GLUCOSE SERPL-MCNC: 84 MG/DL (ref 65–99)
HCT VFR BLD AUTO: 39.6 % (ref 35–45)
HDLC SERPL-MCNC: 70 MG/DL
HGB BLD-MCNC: 13.1 G/DL (ref 11.7–15.5)
LDLC SERPL CALC-MCNC: 109 MG/DL (CALC)
LYMPHOCYTES # BLD AUTO: 1676 CELLS/UL (ref 850–3900)
LYMPHOCYTES NFR BLD AUTO: 34.2 %
MCH RBC QN AUTO: 31.2 PG (ref 27–33)
MCHC RBC AUTO-ENTMCNC: 33.1 G/DL (ref 32–36)
MCV RBC AUTO: 94.3 FL (ref 80–100)
MONOCYTES # BLD AUTO: 314 CELLS/UL (ref 200–950)
MONOCYTES NFR BLD AUTO: 6.4 %
NEUTROPHILS # BLD AUTO: 2822 CELLS/UL (ref 1500–7800)
NEUTROPHILS NFR BLD AUTO: 57.6 %
NONHDLC SERPL-MCNC: 124 MG/DL (CALC)
PLATELET # BLD AUTO: 262 THOUSAND/UL (ref 140–400)
PMV BLD REES-ECKER: 10.5 FL (ref 7.5–12.5)
POTASSIUM SERPL-SCNC: 4.3 MMOL/L (ref 3.5–5.3)
PROT SERPL-MCNC: 6.6 G/DL (ref 6.1–8.1)
RBC # BLD AUTO: 4.2 MILLION/UL (ref 3.8–5.1)
SL AMB EGFR AFRICAN AMERICAN: 82 ML/MIN/1.73M2
SL AMB EGFR NON AFRICAN AMERICAN: 71 ML/MIN/1.73M2
SODIUM SERPL-SCNC: 140 MMOL/L (ref 135–146)
TRIGL SERPL-MCNC: 61 MG/DL
TSH SERPL-ACNC: 1.15 MIU/L (ref 0.4–4.5)
WBC # BLD AUTO: 4.9 THOUSAND/UL (ref 3.8–10.8)

## 2020-12-16 ENCOUNTER — HOSPITAL ENCOUNTER (OUTPATIENT)
Dept: RADIOLOGY | Age: 60
Discharge: HOME | End: 2020-12-16
Payer: COMMERCIAL

## 2020-12-16 VITALS — WEIGHT: 145 LBS | BODY MASS INDEX: 22.76 KG/M2 | HEIGHT: 67 IN

## 2020-12-16 DIAGNOSIS — Z12.31 ENCOUNTER FOR SCREENING MAMMOGRAM FOR MALIGNANT NEOPLASM OF BREAST: ICD-10-CM

## 2020-12-16 DIAGNOSIS — Z12.31 ENCOUNTER FOR SCREENING MAMMOGRAM FOR BREAST CANCER: ICD-10-CM

## 2020-12-16 PROCEDURE — 77063 BREAST TOMOSYNTHESIS BI: CPT

## 2020-12-16 PROCEDURE — 77067 SCR MAMMO BI INCL CAD: CPT

## 2021-03-10 DIAGNOSIS — Z23 ENCOUNTER FOR IMMUNIZATION: ICD-10-CM

## 2021-03-24 ENCOUNTER — OFFICE VISIT (OUTPATIENT)
Dept: URGENT CARE | Age: 61
End: 2021-03-24
Payer: COMMERCIAL

## 2021-03-24 VITALS — HEART RATE: 97 BPM | RESPIRATION RATE: 18 BRPM | TEMPERATURE: 98 F | OXYGEN SATURATION: 98 %

## 2021-03-24 DIAGNOSIS — Z20.822 EXPOSURE TO COVID-19 VIRUS: Primary | ICD-10-CM

## 2021-03-24 PROCEDURE — S9083 URGENT CARE CENTER GLOBAL: HCPCS | Performed by: PHYSICIAN ASSISTANT

## 2021-03-24 PROCEDURE — U0005 INFEC AGEN DETEC AMPLI PROBE: HCPCS | Performed by: PHYSICIAN ASSISTANT

## 2021-03-24 PROCEDURE — U0003 INFECTIOUS AGENT DETECTION BY NUCLEIC ACID (DNA OR RNA); SEVERE ACUTE RESPIRATORY SYNDROME CORONAVIRUS 2 (SARS-COV-2) (CORONAVIRUS DISEASE [COVID-19]), AMPLIFIED PROBE TECHNIQUE, MAKING USE OF HIGH THROUGHPUT TECHNOLOGIES AS DESCRIBED BY CMS-2020-01-R: HCPCS | Performed by: PHYSICIAN ASSISTANT

## 2021-03-24 PROCEDURE — G0382 LEV 3 HOSP TYPE B ED VISIT: HCPCS | Performed by: PHYSICIAN ASSISTANT

## 2021-03-24 NOTE — PROGRESS NOTES
[id number]        NAME: Yumiko Pearson is a 62 y o  female  : 1960    MRN: [id number]  DATE: 2021  TIME: 10:48 AM    Assessment and Plan   Exposure to COVID-19 virus [Z20 822]  1  Exposure to COVID-19 virus  Novel Coronavirus (Covid-19),PCR [facility] - Office Collection         Patient Instructions       Follow up with PCP in 3-5 days  Proceed to  ER if symptoms worsen  Chief Complaint     Chief Complaint   Patient presents with    COVID-19     exposure         History of Present Illness         Patient presents for COVID testing  She was exposed to her boss   One day later tested positive for COVID  She has been asymptomatic for the past 7 days  She is scheduled for her COVID vaccine on Saturday  She would like a COVID test to know if she can get her vaccine  Review of Systems   Review of Systems   Constitutional: Negative  HENT: Negative  Respiratory: Negative  Cardiovascular: Negative  Gastrointestinal: Negative  Musculoskeletal: Negative  Neurological: Negative  Psychiatric/Behavioral: Negative            Current Medications       Current Outpatient Medications:     albuterol (2 5 mg/3 mL) 0 083 % nebulizer solution, Take 1 vial (2 5 mg total) by nebulization every 4 (four) hours as needed for wheezing or shortness of breath, Disp: 150 mL, Rfl: 5    albuterol (PROVENTIL HFA,VENTOLIN HFA) 90 mcg/act inhaler, 2 puffs QID PRN,Patient prefers Ventolin brand, Disp: 1 Inhaler, Rfl: 5    budesonide-formoterol (Symbicort) 160-4 5 mcg/act inhaler, Inhale 2 puffs 2 (two) times a day, Disp: 10 2 Inhaler, Rfl: 11    Cholecalciferol (VITAMIN D-3 PO), Take 1,000 mg by mouth daily, Disp: , Rfl:     Collagenase POWD, Take 1 Dose by mouth daily, Disp: , Rfl:     Magnesium Ascorbate POWD, 0 5 Doses by Does not apply route daily, Disp: , Rfl:     Current Allergies     Allergies as of 2021 - Reviewed 2021   Allergen Reaction Noted    Fluticasone-salmeterol GI Intolerance 10/21/2014    Milk-related compounds Facial Swelling and Lip Swelling 04/28/2016    Mometasone furo-formoterol fum GI Intolerance 10/21/2014    Other Sneezing 04/18/2018            The following portions of the patient's history were reviewed and updated as appropriate: allergies, current medications, past family history, past medical history, past social history, past surgical history and problem list      Past Medical History:   Diagnosis Date    Allergic     Anemia     Asthma     Pneumonia        Past Surgical History:   Procedure Laterality Date    COLONOSCOPY N/A 4/29/2016    Procedure: COLONOSCOPY;  Surgeon: Janelle Hunter DO;  Location: [facility] GI LAB; Service:        Family History   Problem Relation Age of Onset    No Known Problems Mother     Other Father         bundle branch block; white blood cell disorder    Asthma Daughter     No Known Problems Sister     No Known Problems Maternal Grandmother     No Known Problems Maternal Grandfather     No Known Problems Paternal Grandmother     No Known Problems Paternal Grandfather     No Known Problems Sister     Asthma Daughter     No Known Problems Paternal Aunt     Kidney cancer Brother 45         Medications have been verified  Objective   Pulse 97   Temp 98 °F (36 7 °C)   Resp 18   SpO2 98%        Physical Exam     Physical Exam  Vitals signs and nursing note reviewed  Constitutional:       General: She is not in acute distress  Appearance: Normal appearance  She is not ill-appearing or toxic-appearing  Cardiovascular:      Rate and Rhythm: Normal rate and regular rhythm  Pulses: Normal pulses  Heart sounds: Normal heart sounds  Pulmonary:      Effort: Pulmonary effort is normal       Breath sounds: Normal breath sounds  No wheezing  Skin:     General: Skin is warm and dry  Neurological:      General: No focal deficit present        Mental Status: She is alert and oriented to person, place, and time     Psychiatric:         Mood and Affect: Mood normal          Behavior: Behavior normal

## 2021-03-24 NOTE — PATIENT INSTRUCTIONS

## 2021-03-25 LAB — SARS-COV-2 RNA RESP QL NAA+PROBE: NEGATIVE

## 2021-11-13 DIAGNOSIS — J45.40 MODERATE PERSISTENT ASTHMA, UNSPECIFIED WHETHER COMPLICATED: ICD-10-CM

## 2021-11-15 ENCOUNTER — OFFICE VISIT (OUTPATIENT)
Dept: FAMILY MEDICINE CLINIC | Facility: CLINIC | Age: 61
End: 2021-11-15
Payer: COMMERCIAL

## 2021-11-15 VITALS
WEIGHT: 147 LBS | OXYGEN SATURATION: 97 % | RESPIRATION RATE: 16 BRPM | TEMPERATURE: 97.8 F | DIASTOLIC BLOOD PRESSURE: 60 MMHG | HEART RATE: 67 BPM | HEIGHT: 67 IN | SYSTOLIC BLOOD PRESSURE: 106 MMHG | BODY MASS INDEX: 23.07 KG/M2

## 2021-11-15 DIAGNOSIS — R53.83 FATIGUE, UNSPECIFIED TYPE: ICD-10-CM

## 2021-11-15 DIAGNOSIS — Z13.220 NEED FOR LIPID SCREENING: ICD-10-CM

## 2021-11-15 DIAGNOSIS — E55.9 VITAMIN D DEFICIENCY: ICD-10-CM

## 2021-11-15 DIAGNOSIS — Z00.00 ENCOUNTER FOR WELLNESS EXAMINATION IN ADULT: Primary | ICD-10-CM

## 2021-11-15 DIAGNOSIS — J45.20 MILD INTERMITTENT ASTHMA WITHOUT COMPLICATION: Chronic | ICD-10-CM

## 2021-11-15 PROCEDURE — 3725F SCREEN DEPRESSION PERFORMED: CPT | Performed by: FAMILY MEDICINE

## 2021-11-15 PROCEDURE — 1036F TOBACCO NON-USER: CPT | Performed by: FAMILY MEDICINE

## 2021-11-15 PROCEDURE — 99396 PREV VISIT EST AGE 40-64: CPT | Performed by: FAMILY MEDICINE

## 2021-11-15 PROCEDURE — 3008F BODY MASS INDEX DOCD: CPT | Performed by: FAMILY MEDICINE

## 2021-11-15 RX ORDER — BUDESONIDE AND FORMOTEROL FUMARATE DIHYDRATE 160; 4.5 UG/1; UG/1
AEROSOL RESPIRATORY (INHALATION)
Qty: 10.2 G | Refills: 11 | Status: SHIPPED | OUTPATIENT
Start: 2021-11-15

## 2021-12-17 ENCOUNTER — HOSPITAL ENCOUNTER (OUTPATIENT)
Dept: RADIOLOGY | Age: 61
Discharge: HOME | End: 2021-12-17
Payer: COMMERCIAL

## 2021-12-17 VITALS — WEIGHT: 147 LBS | HEIGHT: 67 IN | BODY MASS INDEX: 23.07 KG/M2

## 2021-12-17 DIAGNOSIS — Z12.31 ENCOUNTER FOR SCREENING MAMMOGRAM FOR MALIGNANT NEOPLASM OF BREAST: ICD-10-CM

## 2021-12-17 PROCEDURE — 77063 BREAST TOMOSYNTHESIS BI: CPT

## 2021-12-17 PROCEDURE — 77067 SCR MAMMO BI INCL CAD: CPT

## 2022-01-17 ENCOUNTER — HOSPITAL ENCOUNTER (OUTPATIENT)
Dept: ULTRASOUND IMAGING | Facility: CLINIC | Age: 62
Discharge: HOME | End: 2022-01-17
Payer: COMMERCIAL

## 2022-01-17 ENCOUNTER — HOSPITAL ENCOUNTER (OUTPATIENT)
Dept: MAMMOGRAPHY | Facility: CLINIC | Age: 62
Discharge: HOME | End: 2022-01-17
Payer: COMMERCIAL

## 2022-01-17 VITALS — HEIGHT: 67 IN | BODY MASS INDEX: 23.07 KG/M2 | WEIGHT: 147 LBS

## 2022-01-17 DIAGNOSIS — R92.8 ABNORMAL SCREENING MAMMOGRAM: ICD-10-CM

## 2022-01-17 PROCEDURE — 76642 ULTRASOUND BREAST LIMITED: CPT

## 2022-01-17 PROCEDURE — 77065 DX MAMMO INCL CAD UNI: CPT

## 2022-01-17 PROCEDURE — G0279 TOMOSYNTHESIS, MAMMO: HCPCS

## 2022-06-12 DIAGNOSIS — J45.40 MODERATE PERSISTENT ASTHMA WITHOUT COMPLICATION: ICD-10-CM

## 2022-06-14 RX ORDER — ALBUTEROL SULFATE 2.5 MG/3ML
2.5 SOLUTION RESPIRATORY (INHALATION) EVERY 4 HOURS PRN
Qty: 150 ML | Refills: 3 | Status: SHIPPED | OUTPATIENT
Start: 2022-06-14

## 2022-08-31 ENCOUNTER — OFFICE VISIT (OUTPATIENT)
Dept: FAMILY MEDICINE CLINIC | Facility: CLINIC | Age: 62
End: 2022-08-31
Payer: COMMERCIAL

## 2022-08-31 VITALS
RESPIRATION RATE: 18 BRPM | SYSTOLIC BLOOD PRESSURE: 110 MMHG | BODY MASS INDEX: 22.91 KG/M2 | OXYGEN SATURATION: 100 % | TEMPERATURE: 98 F | HEIGHT: 67 IN | DIASTOLIC BLOOD PRESSURE: 70 MMHG | WEIGHT: 146 LBS | HEART RATE: 72 BPM

## 2022-08-31 DIAGNOSIS — J06.9 UPPER RESPIRATORY TRACT INFECTION, UNSPECIFIED TYPE: ICD-10-CM

## 2022-08-31 DIAGNOSIS — J45.21 MILD INTERMITTENT ASTHMA WITH EXACERBATION: Primary | ICD-10-CM

## 2022-08-31 PROCEDURE — 99213 OFFICE O/P EST LOW 20 MIN: CPT | Performed by: NURSE PRACTITIONER

## 2022-08-31 RX ORDER — PREDNISONE 10 MG/1
TABLET ORAL
Qty: 20 TABLET | Refills: 0 | Status: SHIPPED | OUTPATIENT
Start: 2022-08-31

## 2022-08-31 RX ORDER — AZITHROMYCIN 250 MG/1
TABLET, FILM COATED ORAL
Qty: 6 TABLET | Refills: 0 | Status: SHIPPED | OUTPATIENT
Start: 2022-08-31 | End: 2022-09-05

## 2022-08-31 NOTE — PROGRESS NOTES
FAMILY PRACTICE OFFICE VISIT       NAME: Caitlyn Montanez  AGE: 65 y o  SEX: female       : 1960        MRN: [id number]    Assessment and Plan   1  Mild intermittent asthma with exacerbation  -     predniSONE 10 mg tablet; Take 4 tablets for 2 days, 3 tablets for 2 days, 2 tablets for 2 days, 1 tablet for 2 days  -     azithromycin (ZITHROMAX) 250 mg tablet; Take 2 tablets on day 1 and then take 1 tablet on days 2-5     2  Upper respiratory tract infection, unspecified type  -     predniSONE 10 mg tablet; Take 4 tablets for 2 days, 3 tablets for 2 days, 2 tablets for 2 days, 1 tablet for 2 days  -     azithromycin (ZITHROMAX) 250 mg tablet; Take 2 tablets on day 1 and then take 1 tablet on days 2-5  Start Prednisone taper and z-fausto  Continue symbicort 160-4 5 mcg/act 2 puffs twice daily  Albuterol inhaler as needed  Call if symptoms are not improving with medications over the next 3-5 days  Chief Complaint     Chief Complaint   Patient presents with    Cold Like Symptoms     Post nasal drip, chest congestion and asthma 3 + days       History of Present Illness     Caitlyn Montanez is a 65year old female presenting today for URI symptoms  Started 1 week ago  Doubled Symbicort to twice per day  Has cough  Lost voice today  +sinus pressure  +post nasal drip, started one month ago, has seasonal allergies  No fevers, body aches, chills or sweats  Daughter and grandson have bronchitis and ear infection   not feeling well today  Yesterdayinitially thought she was getting better  Today, no voice and worse cough  Wheezing and shortness of breath starting yesterday  Had COVID-19 in April with her   Daughter had COVID-19 in   Used albuterol yesterday, first time in long time, works, but does not last long  Cough  This is a recurrent problem  The current episode started in the past 7 days  The problem has been gradually worsening   The problem occurs every few minutes  The cough is productive of sputum  Associated symptoms include headaches, nasal congestion, postnasal drip, a sore throat, shortness of breath and wheezing  Pertinent negatives include no chest pain, chills, ear congestion, ear pain, fever, heartburn, hemoptysis, myalgias, rash, rhinorrhea, sweats or weight loss  The symptoms are aggravated by lying down  Review of Systems   Review of Systems   Constitutional: Negative for chills, fever and weight loss  HENT: Positive for postnasal drip and sore throat  Negative for ear pain and rhinorrhea  Respiratory: Positive for cough, shortness of breath and wheezing  Negative for hemoptysis  Cardiovascular: Negative for chest pain  Gastrointestinal: Negative for heartburn  Musculoskeletal: Negative for myalgias  Skin: Negative for rash  Neurological: Positive for headaches  I have reviewed the patient's medical history in detail; there are no changes to the history as noted in the electronic medical record  Objective     Vitals:    08/31/22 1127   BP: 110/70   Pulse: 72   Resp: 18   Temp: 98 °F (36 7 °C)   TempSrc: Temporal   SpO2: 100%   Weight: 66 2 kg (146 lb)   Height: 5' 6 5" (1 689 m)     Wt Readings from Last 3 Encounters:   08/31/22 66 2 kg (146 lb)   01/17/22 66 7 kg (147 lb)   12/17/21 66 7 kg (147 lb)     Physical Exam  Vitals and nursing note reviewed  Constitutional:       General: She is not in acute distress  Appearance: Normal appearance  She is well-developed  She is not ill-appearing  HENT:      Head: Normocephalic and atraumatic  Right Ear: Tympanic membrane and ear canal normal       Left Ear: Tympanic membrane and ear canal normal       Nose: Mucosal edema, congestion and rhinorrhea present  Mouth/Throat:      Pharynx: No oropharyngeal exudate or posterior oropharyngeal erythema        Comments: Hoarse voice  Eyes:      Conjunctiva/sclera: Conjunctivae normal  Cardiovascular:      Rate and Rhythm: Normal rate and regular rhythm  Heart sounds: Normal heart sounds  No murmur heard  Pulmonary:      Effort: Pulmonary effort is normal       Breath sounds: Normal breath sounds  Musculoskeletal:      Cervical back: Normal range of motion and neck supple  Right lower leg: No edema  Left lower leg: No edema  Lymphadenopathy:      Cervical: Cervical adenopathy present  Neurological:      Mental Status: She is alert  Psychiatric:         Mood and Affect: Mood normal          Depression Screening and Follow-up Plan: Patient was screened for depression during today's encounter  They screened negative with a PHQ-2 score of 0  ALLERGIES:  Allergies   Allergen Reactions    Fluticasone-Salmeterol GI Intolerance     Category: Adverse Reaction;     Milk-Related Compounds - Food Allergy Facial Swelling and Lip Swelling    Mometasone Furo-Formoterol Fum GI Intolerance     Category:  Adverse Reaction;     Other Sneezing     Animal dander, pollen, and seasonal        Current Medications     Current Outpatient Medications   Medication Sig Dispense Refill    albuterol (2 5 mg/3 mL) 0 083 % nebulizer solution Take 3 mL (2 5 mg total) by nebulization every 4 (four) hours as needed for wheezing or shortness of breath 150 mL 3    albuterol (PROVENTIL HFA,VENTOLIN HFA) 90 mcg/act inhaler 2 puffs QID PRN,Patient prefers Ventolin brand 1 Inhaler 5    Ascorbic Acid (VITAMIN C PO) Take 1 tablet by mouth daily      azithromycin (ZITHROMAX) 250 mg tablet Take 2 tablets on day 1 and then take 1 tablet on days 2-5  6 tablet 0    BIOTIN PO Take 1 tablet by mouth daily      CALCIUM PO Take 1 tablet by mouth daily      Cholecalciferol (VITAMIN D-3 PO) Take 1,000 mg by mouth daily      Collagenase POWD Take 1 Dose by mouth daily      ELDERBERRY PO Take 1 capsule by mouth daily      FOLIC ACID PO Take 1 tablet by mouth daily      MAGNESIUM-ZINC PO Take 1 tablet by mouth daily      predniSONE 10 mg tablet Take 4 tablets for 2 days, 3 tablets for 2 days, 2 tablets for 2 days, 1 tablet for 2 days  20 tablet 0    Symbicort 160-4 5 MCG/ACT inhaler TAKE 2 PUFFS BY MOUTH TWICE A DAY 10 2 g 11     No current facility-administered medications for this visit           Health Maintenance     Health Maintenance   Topic Date Due    Hepatitis C Screening  Never done    Pneumococcal Vaccine: Pediatrics (0 to 5 Years) and At-Risk Patients (6 to 59 Years) (1 - PCV) Never done    HIV Screening  Never done    COVID-19 Vaccine (3 - Booster for itembase series) 03/11/2022    Influenza Vaccine (1) 09/01/2022    Annual Physical  11/15/2022    Depression Screening  08/31/2023    BMI: Adult  08/31/2023    Breast Cancer Screening: Mammogram  01/17/2024    Cervical Cancer Screening  02/19/2024    Colorectal Cancer Screening  04/29/2026    DTaP,Tdap,and Td Vaccines (2 - Td or Tdap) 04/18/2028    HIB Vaccine  Aged Out    Hepatitis B Vaccine  Aged Out    IPV Vaccine  Aged Out    Hepatitis A Vaccine  Aged Out    Meningococcal ACWY Vaccine  Aged Out    HPV Vaccine  Aged Out     Immunization History   Administered Date(s) Administered    COVID-19 J&J ([employer]) vaccine 0 5 mL 03/27/2021    COVID-19 MODERNA VACC 0 5 ML IM 11/11/2021    Fluzone Split Quad 0 25 mL 10/28/2019    INFLUENZA 12/31/2018, 11/14/2020    Influenza, seasonal, injectable, preservative free 12/31/2018    Tdap 04/18/2018       SOLOMON Ramey

## 2023-01-02 ENCOUNTER — OFFICE VISIT (OUTPATIENT)
Dept: URGENT CARE | Facility: CLINIC | Age: 63
End: 2023-01-02

## 2023-01-02 ENCOUNTER — APPOINTMENT (OUTPATIENT)
Dept: URGENT CARE | Facility: CLINIC | Age: 63
End: 2023-01-02

## 2023-01-02 VITALS
WEIGHT: 150 LBS | BODY MASS INDEX: 24.11 KG/M2 | HEART RATE: 73 BPM | HEIGHT: 66 IN | OXYGEN SATURATION: 99 % | TEMPERATURE: 97.6 F | RESPIRATION RATE: 18 BRPM

## 2023-01-02 DIAGNOSIS — J45.31 MILD PERSISTENT ASTHMA WITH ACUTE EXACERBATION: Primary | ICD-10-CM

## 2023-01-02 RX ORDER — PREDNISONE 10 MG/1
TABLET ORAL
Qty: 35 TABLET | Refills: 0 | Status: SHIPPED | OUTPATIENT
Start: 2023-01-02 | End: 2023-01-17

## 2023-01-02 NOTE — PROGRESS NOTES
[facility]        NAME: Angelique De Leon is a 65 y o  female  : 1960    MRN: [id number]  DATE: 2023  TIME: 12:52 PM    Assessment and Orders   Mild persistent asthma with acute exacerbation [J45 31]  1  Mild persistent asthma with acute exacerbation  predniSONE 10 mg tablet            Plan and Discussion      Symptoms and exam consistent with acute asthma exacerbation, likely secondary to recent viral illness  Patient to continue to use nebulizer as needed  Will send RX for long steroid taper  Risks and benefits discussed  Patient understands and agrees with the plan  Follow up with PCP  Chief Complaint     Chief Complaint   Patient presents with   • Cough     PT presents with dry cough and wheezing upon ausculation x 3 days  Pt states hx asthma and has been exposed to possible RSV in the house  Pt used symbicort this morning but no other medications  Pt states fever of unknown number (felt chills and body aches) x 2 days ago that has since resolved  Pt states she uses a rescue inhaler and nebulizer at home as needed  History of Present Illness       HPI   Patient is a 66 yo F who presents with cough and wheezing x3 days  She previously had cough with body aches and chills however these symptoms have resolved  She continues to have cough and wheezing that does not seem to be responding to neb treatments or inhaler use  She states that her chest feels tight  Review of Systems   Review of Systems   Constitutional: Negative for fever  Respiratory: Positive for cough, chest tightness, shortness of breath and wheezing  Cardiovascular: Negative for chest pain           Current Medications       Current Outpatient Medications:   •  albuterol (2 5 mg/3 mL) 0 083 % nebulizer solution, Take 3 mL (2 5 mg total) by nebulization every 4 (four) hours as needed for wheezing or shortness of breath, Disp: 150 mL, Rfl: 3  •  albuterol (PROVENTIL HFA,VENTOLIN HFA) 90 mcg/act inhaler, 2 puffs QID PRN,Patient prefers Ventolin brand, Disp: 1 Inhaler, Rfl: 5  •  Ascorbic Acid (VITAMIN C PO), Take 1 tablet by mouth daily, Disp: , Rfl:   •  BIOTIN PO, Take 1 tablet by mouth daily, Disp: , Rfl:   •  CALCIUM PO, Take 1 tablet by mouth daily, Disp: , Rfl:   •  Cholecalciferol (VITAMIN D-3 PO), Take 1,000 mg by mouth daily, Disp: , Rfl:   •  FOLIC ACID PO, Take 1 tablet by mouth daily, Disp: , Rfl:   •  MAGNESIUM-ZINC PO, Take 1 tablet by mouth daily, Disp: , Rfl:   •  predniSONE 10 mg tablet, Take 4 tablets (40 mg total) by mouth daily for 5 days, THEN 2 tablets (20 mg total) daily for 5 days, THEN 1 tablet (10 mg total) daily for 5 days  , Disp: 35 tablet, Rfl: 0  •  Symbicort 160-4 5 MCG/ACT inhaler, TAKE 2 PUFFS BY MOUTH TWICE A DAY, Disp: 10 2 g, Rfl: 11  •  Collagenase POWD, Take 1 Dose by mouth daily (Patient not taking: Reported on 1/2/2023), Disp: , Rfl:   •  ELDERBERRY PO, Take 1 capsule by mouth daily (Patient not taking: Reported on 1/2/2023), Disp: , Rfl:   •  predniSONE 10 mg tablet, Take 4 tablets for 2 days, 3 tablets for 2 days, 2 tablets for 2 days, 1 tablet for 2 days   (Patient not taking: Reported on 1/2/2023), Disp: 20 tablet, Rfl: 0    Current Allergies     Allergies as of 01/02/2023 - Reviewed 01/02/2023   Allergen Reaction Noted   • Fluticasone-salmeterol GI Intolerance 10/21/2014   • Milk-related compounds - food allergy Facial Swelling and Lip Swelling 04/28/2016   • Mometasone furo-formoterol fum GI Intolerance 10/21/2014   • Other Sneezing 04/18/2018            The following portions of the patient's history were reviewed and updated as appropriate: allergies, current medications, past family history, past medical history, past social history, past surgical history and problem list      Past Medical History:   Diagnosis Date   • Allergic    • Anemia    • Asthma    • Pneumonia        Past Surgical History:   Procedure Laterality Date   • COLONOSCOPY N/A 4/29/2016    Procedure: COLONOSCOPY;  Surgeon: Lieutenant Malcolm DO;  Location: [facility] GI [facility]; Service:        Family History   Problem Relation Age of Onset   • No Known Problems Mother    • Other Father         bundle branch block; white blood cell disorder   • Asthma Daughter    • No Known Problems Sister    • No Known Problems Maternal Grandmother    • No Known Problems Maternal Grandfather    • No Known Problems Paternal Grandmother    • No Known Problems Paternal Grandfather    • No Known Problems Sister    • Asthma Daughter    • No Known Problems Paternal Aunt    • Kidney cancer Brother 39         Medications have been verified  Objective   Pulse 73   Temp 97 6 °F (36 4 °C)   Resp 18   Ht 5' 6" (1 676 m)   Wt 68 kg (150 lb)   SpO2 99%   BMI 24 21 kg/m²   No LMP recorded  Patient is postmenopausal        Physical Exam     Physical Exam  Constitutional:       General: She is not in acute distress  Appearance: She is not ill-appearing  Cardiovascular:      Rate and Rhythm: Normal rate and regular rhythm  Pulmonary:      Effort: Pulmonary effort is normal       Comments: Decreased breath sounds with wheezing diffusely  Neurological:      General: No focal deficit present  Mental Status: She is alert and oriented to person, place, and time     Psychiatric:         Mood and Affect: Mood normal          Behavior: Behavior normal                Buddy Land Janina DO

## 2023-01-11 ENCOUNTER — OFFICE VISIT (OUTPATIENT)
Dept: URGENT CARE | Facility: CLINIC | Age: 63
End: 2023-01-11

## 2023-01-11 VITALS
TEMPERATURE: 98.2 F | HEART RATE: 87 BPM | RESPIRATION RATE: 16 BRPM | HEIGHT: 66 IN | SYSTOLIC BLOOD PRESSURE: 127 MMHG | OXYGEN SATURATION: 98 % | BODY MASS INDEX: 24.11 KG/M2 | DIASTOLIC BLOOD PRESSURE: 59 MMHG | WEIGHT: 150 LBS

## 2023-01-11 DIAGNOSIS — J01.10 ACUTE NON-RECURRENT FRONTAL SINUSITIS: Primary | ICD-10-CM

## 2023-01-11 RX ORDER — AMOXICILLIN AND CLAVULANATE POTASSIUM 875; 125 MG/1; MG/1
1 TABLET, FILM COATED ORAL EVERY 12 HOURS SCHEDULED
Qty: 14 TABLET | Refills: 0 | Status: SHIPPED | OUTPATIENT
Start: 2023-01-11 | End: 2023-01-18

## 2023-01-11 NOTE — PROGRESS NOTES
[facility]        NAME: Sean Harp is a 65 y o  female  : 1960    MRN: [id number]  DATE: 2023  TIME: 10:54 AM    Assessment and Plan   Acute non-recurrent frontal sinusitis [J01 10]  1  Acute non-recurrent frontal sinusitis  amoxicillin-clavulanate (AUGMENTIN) 875-125 mg per tablet            Patient Instructions   Augmentin twice a day for 7 days  Saline nasal spray as directed to rinse sinus passages  Zyrtec 10mg daily as discussed  Increase your fluid intake  Tylenol  as needed for pain or fever  Follow up with your PCP for worsening or concerning symptoms      Follow up with PCP in 3-5 days  Proceed to  ER if symptoms worsen  Chief Complaint     Chief Complaint   Patient presents with   • Facial Pain     Pt is c/o sinus pressure and discharge in her eyes and her ears feel hollow  History of Present Illness       Patient is a 65year old female presenting with sinus congestion for the past 1 week  She has frontal headache that moves behind her eyes  She feels her ears are "hollow"  Denies dishcarge or drainage from the ears  She had discharge from the right eye with some photosensitivity  She is taking tylenol and a prednisone taper for asthma exacerbation  Review of Systems   Review of Systems   Constitutional: Negative for activity change, chills and fever  HENT: Positive for congestion, sinus pressure and sinus pain  Negative for ear discharge, ear pain and sore throat  Eyes: Positive for photophobia and discharge  Negative for pain, redness, itching and visual disturbance  Respiratory: Negative for cough and shortness of breath  Neurological: Positive for headaches           Current Medications       Current Outpatient Medications:   •  albuterol (2 5 mg/3 mL) 0 083 % nebulizer solution, Take 3 mL (2 5 mg total) by nebulization every 4 (four) hours as needed for wheezing or shortness of breath, Disp: 150 mL, Rfl: 3  •  albuterol (PROVENTIL HFA,VENTOLIN HFA) 90 mcg/act inhaler, 2 puffs QID PRN,Patient prefers Ventolin brand, Disp: 1 Inhaler, Rfl: 5  •  amoxicillin-clavulanate (AUGMENTIN) 875-125 mg per tablet, Take 1 tablet by mouth every 12 (twelve) hours for 7 days, Disp: 14 tablet, Rfl: 0  •  Ascorbic Acid (VITAMIN C PO), Take 1 tablet by mouth daily, Disp: , Rfl:   •  BIOTIN PO, Take 1 tablet by mouth daily, Disp: , Rfl:   •  CALCIUM PO, Take 1 tablet by mouth daily, Disp: , Rfl:   •  Cholecalciferol (VITAMIN D-3 PO), Take 1,000 mg by mouth daily, Disp: , Rfl:   •  Collagenase POWD, Take 1 Dose by mouth daily, Disp: , Rfl:   •  ELDERBERRY PO, Take 1 capsule by mouth daily, Disp: , Rfl:   •  FOLIC ACID PO, Take 1 tablet by mouth daily, Disp: , Rfl:   •  MAGNESIUM-ZINC PO, Take 1 tablet by mouth daily, Disp: , Rfl:   •  predniSONE 10 mg tablet, Take 4 tablets for 2 days, 3 tablets for 2 days, 2 tablets for 2 days, 1 tablet for 2 days  , Disp: 20 tablet, Rfl: 0  •  predniSONE 10 mg tablet, Take 4 tablets (40 mg total) by mouth daily for 5 days, THEN 2 tablets (20 mg total) daily for 5 days, THEN 1 tablet (10 mg total) daily for 5 days  , Disp: 35 tablet, Rfl: 0  •  Symbicort 160-4 5 MCG/ACT inhaler, TAKE 2 PUFFS BY MOUTH TWICE A DAY, Disp: 10 2 g, Rfl: 11    Current Allergies     Allergies as of 01/11/2023 - Reviewed 01/11/2023   Allergen Reaction Noted   • Fluticasone-salmeterol GI Intolerance 10/21/2014   • Milk-related compounds - food allergy Facial Swelling and Lip Swelling 04/28/2016   • Mometasone furo-formoterol fum GI Intolerance 10/21/2014   • Other Sneezing 04/18/2018            The following portions of the patient's history were reviewed and updated as appropriate: allergies, current medications, past family history, past medical history, past social history, past surgical history and problem list      Past Medical History:   Diagnosis Date   • Allergic    • Anemia    • Asthma    • Pneumonia        Past Surgical History:   Procedure Laterality Date   • COLONOSCOPY N/A 4/29/2016    Procedure: COLONOSCOPY;  Surgeon: Marcela Ash DO;  Location: [facility] GI LAB; Service:        Family History   Problem Relation Age of Onset   • No Known Problems Mother    • Other Father         bundle branch block; white blood cell disorder   • Asthma Daughter    • No Known Problems Sister    • No Known Problems Maternal Grandmother    • No Known Problems Maternal Grandfather    • No Known Problems Paternal Grandmother    • No Known Problems Paternal Grandfather    • No Known Problems Sister    • Asthma Daughter    • No Known Problems Paternal Aunt    • Kidney cancer Brother 39         Medications have been verified  Objective   /59   Pulse 87   Temp 98 2 °F (36 8 °C)   Resp 16   Ht 5' 6" (1 676 m)   Wt 68 kg (150 lb)   SpO2 98%   BMI 24 21 kg/m²        Physical Exam     Physical Exam  Vitals reviewed  Constitutional:       General: She is awake  She is not in acute distress  Appearance: Normal appearance  She is normal weight  HENT:      Head: Normocephalic  Right Ear: Hearing normal  A middle ear effusion is present  Left Ear: Hearing normal  A middle ear effusion is present  Nose:      Right Sinus: Frontal sinus tenderness present  Left Sinus: Frontal sinus tenderness present  Mouth/Throat:      Lips: Pink  Pharynx: Oropharynx is clear  Cardiovascular:      Rate and Rhythm: Normal rate and regular rhythm  Heart sounds: Normal heart sounds, S1 normal and S2 normal    Pulmonary:      Effort: Pulmonary effort is normal       Breath sounds: Normal breath sounds  No decreased breath sounds, wheezing, rhonchi or rales  Skin:     General: Skin is warm and moist    Neurological:      General: No focal deficit present  Mental Status: She is alert and oriented to person, place, and time  Psychiatric:         Behavior: Behavior is cooperative

## 2023-02-03 ENCOUNTER — OFFICE VISIT (OUTPATIENT)
Dept: FAMILY MEDICINE CLINIC | Facility: CLINIC | Age: 63
End: 2023-02-03

## 2023-02-03 VITALS
SYSTOLIC BLOOD PRESSURE: 116 MMHG | HEIGHT: 66 IN | HEART RATE: 94 BPM | TEMPERATURE: 98 F | BODY MASS INDEX: 23.69 KG/M2 | WEIGHT: 147.4 LBS | DIASTOLIC BLOOD PRESSURE: 80 MMHG | OXYGEN SATURATION: 95 % | RESPIRATION RATE: 16 BRPM

## 2023-02-03 DIAGNOSIS — E55.9 VITAMIN D DEFICIENCY: ICD-10-CM

## 2023-02-03 DIAGNOSIS — J45.40 MODERATE PERSISTENT ASTHMA WITHOUT COMPLICATION: ICD-10-CM

## 2023-02-03 DIAGNOSIS — J01.00 ACUTE NON-RECURRENT MAXILLARY SINUSITIS: ICD-10-CM

## 2023-02-03 DIAGNOSIS — B37.0 THRUSH: ICD-10-CM

## 2023-02-03 DIAGNOSIS — Z12.31 ENCOUNTER FOR SCREENING MAMMOGRAM FOR BREAST CANCER: ICD-10-CM

## 2023-02-03 DIAGNOSIS — J45.40 MODERATE PERSISTENT ASTHMA, UNSPECIFIED WHETHER COMPLICATED: ICD-10-CM

## 2023-02-03 DIAGNOSIS — E28.319 EARLY ONSET MENOPAUSE: ICD-10-CM

## 2023-02-03 DIAGNOSIS — Z00.00 ENCOUNTER FOR WELLNESS EXAMINATION IN ADULT: Primary | ICD-10-CM

## 2023-02-03 DIAGNOSIS — E78.5 DYSLIPIDEMIA: ICD-10-CM

## 2023-02-03 LAB
25(OH)D3 SERPL-MCNC: 31 NG/ML (ref 30–100)
ALBUMIN SERPL-MCNC: 4 G/DL (ref 3.6–5.1)
ALBUMIN/GLOB SERPL: 1.4 (CALC) (ref 1–2.5)
ALP SERPL-CCNC: 61 U/L (ref 37–153)
ALT SERPL-CCNC: 12 U/L (ref 6–29)
AST SERPL-CCNC: 13 U/L (ref 10–35)
BASOPHILS # BLD AUTO: 32 CELLS/UL (ref 0–200)
BASOPHILS NFR BLD AUTO: 0.4 %
BILIRUB SERPL-MCNC: 0.3 MG/DL (ref 0.2–1.2)
BUN SERPL-MCNC: 18 MG/DL (ref 7–25)
BUN/CREAT SERPL: NORMAL (CALC) (ref 6–22)
CALCIUM SERPL-MCNC: 9.8 MG/DL (ref 8.6–10.4)
CHLORIDE SERPL-SCNC: 103 MMOL/L (ref 98–110)
CHOLEST SERPL-MCNC: 194 MG/DL
CHOLEST/HDLC SERPL: 3 (CALC)
CO2 SERPL-SCNC: 31 MMOL/L (ref 20–32)
CREAT SERPL-MCNC: 0.74 MG/DL (ref 0.5–1.05)
EOSINOPHIL # BLD AUTO: 119 CELLS/UL (ref 15–500)
EOSINOPHIL NFR BLD AUTO: 1.5 %
ERYTHROCYTE [DISTWIDTH] IN BLOOD BY AUTOMATED COUNT: 11.8 % (ref 11–15)
GFR/BSA.PRED SERPLBLD CYS-BASED-ARV: 92 ML/MIN/1.73M2
GLOBULIN SER CALC-MCNC: 2.8 G/DL (CALC) (ref 1.9–3.7)
GLUCOSE SERPL-MCNC: 91 MG/DL (ref 65–99)
HCT VFR BLD AUTO: 36.5 % (ref 35–45)
HDLC SERPL-MCNC: 64 MG/DL
HGB BLD-MCNC: 12.3 G/DL (ref 11.7–15.5)
LDLC SERPL CALC-MCNC: 115 MG/DL (CALC)
LYMPHOCYTES # BLD AUTO: 1928 CELLS/UL (ref 850–3900)
LYMPHOCYTES NFR BLD AUTO: 24.4 %
MCH RBC QN AUTO: 29.6 PG (ref 27–33)
MCHC RBC AUTO-ENTMCNC: 33.7 G/DL (ref 32–36)
MCV RBC AUTO: 87.7 FL (ref 80–100)
MONOCYTES # BLD AUTO: 600 CELLS/UL (ref 200–950)
MONOCYTES NFR BLD AUTO: 7.6 %
NEUTROPHILS # BLD AUTO: 5222 CELLS/UL (ref 1500–7800)
NEUTROPHILS NFR BLD AUTO: 66.1 %
NONHDLC SERPL-MCNC: 130 MG/DL (CALC)
PLATELET # BLD AUTO: 441 THOUSAND/UL (ref 140–400)
PMV BLD REES-ECKER: 9.7 FL (ref 7.5–12.5)
POTASSIUM SERPL-SCNC: 4.4 MMOL/L (ref 3.5–5.3)
PROT SERPL-MCNC: 6.8 G/DL (ref 6.1–8.1)
RBC # BLD AUTO: 4.16 MILLION/UL (ref 3.8–5.1)
SODIUM SERPL-SCNC: 140 MMOL/L (ref 135–146)
TRIGL SERPL-MCNC: 61 MG/DL
TSH SERPL-ACNC: 0.81 MIU/L (ref 0.4–4.5)
WBC # BLD AUTO: 7.9 THOUSAND/UL (ref 3.8–10.8)

## 2023-02-03 RX ORDER — BUDESONIDE AND FORMOTEROL FUMARATE DIHYDRATE 160; 4.5 UG/1; UG/1
2 AEROSOL RESPIRATORY (INHALATION) 2 TIMES DAILY
Qty: 10.2 G | Refills: 5 | Status: SHIPPED | OUTPATIENT
Start: 2023-02-03

## 2023-02-03 RX ORDER — CLOTRIMAZOLE 10 MG/1
10 LOZENGE ORAL; TOPICAL 4 TIMES DAILY
Qty: 40 TROCHE | Refills: 0 | Status: SHIPPED | OUTPATIENT
Start: 2023-02-03

## 2023-02-03 RX ORDER — ALBUTEROL SULFATE 90 UG/1
AEROSOL, METERED RESPIRATORY (INHALATION)
Qty: 18 G | Refills: 3 | Status: SHIPPED | OUTPATIENT
Start: 2023-02-03

## 2023-02-03 RX ORDER — CEFPROZIL 500 MG/1
500 TABLET, FILM COATED ORAL 2 TIMES DAILY
Qty: 14 TABLET | Refills: 0 | Status: SHIPPED | OUTPATIENT
Start: 2023-02-03 | End: 2023-02-10

## 2023-02-03 RX ORDER — ALBUTEROL SULFATE 2.5 MG/3ML
2.5 SOLUTION RESPIRATORY (INHALATION) EVERY 4 HOURS PRN
Qty: 270 ML | Refills: 3 | Status: SHIPPED | OUTPATIENT
Start: 2023-02-03

## 2023-02-03 NOTE — PROGRESS NOTES
Name: Naren Nguyen      : 1960      MRN: [id number]  Encounter Provider: Tomas Hassan MD  Encounter Date: 2/3/2023   Encounter department: [address] [place] [address]      1  Encounter for wellness examination in adult  Comments:  Patient is up-to-date with colonoscopy and cervical cancer screening  Proceed with mammogram and DEXA scan  2  Encounter for screening mammogram for breast cancer  -     Mammo screening bilateral w 3d & cad; Future; Expected date: 2023    3  Dyslipidemia  -     CBC and differential; Future  -     Comprehensive metabolic panel; Future  -     Lipid Panel with Direct LDL reflex; Future  -     TSH, 3rd generation; Future    4  Early onset menopause  -     DXA bone density spine hip and pelvis; Future; Expected date: 2023    5  Vitamin D deficiency  -     Vitamin D 25 hydroxy; Future    6  Acute non-recurrent maxillary sinusitis  -     cefprosil (CEFZIL) 500 MG tablet; Take 1 tablet (500 mg total) by mouth 2 (two) times a day for 7 days    7  Thrush  -     clotrimazole (MYCELEX) 10 mg mayra; Take 1 tablet (10 mg total) by mouth 4 (four) times a day    8  Moderate persistent asthma without complication  -     albuterol (PROVENTIL HFA,VENTOLIN HFA) 90 mcg/act inhaler; 2 puffs QID PRN,Patient prefers Ventolin brand  -     albuterol (2 5 mg/3 mL) 0 083 % nebulizer solution; Take 3 mL (2 5 mg total) by nebulization every 4 (four) hours as needed for wheezing or shortness of breath    9  Moderate persistent asthma, unspecified whether complicated  -     budesonide-formoterol (Symbicort) 160-4 5 mcg/act inhaler; Inhale 2 puffs 2 (two) times a day Rinse mouth after use      Patient Instructions   Cefzil, antibiotic for sinusitis  If symptoms of sinus pressure and cough or not improving in 3 to 4 days-please call me we will add course of prednisone  Please gargle with baking soda for the next few weeks  Mycelex-medication for thrush, use it for 7 to 10 days  Blood work, mammogram, bone density scan (check with insurance)             Subjective     Annual well exam  Rx updated  Due for mammography  C/o  Nasal congestion after recent travel to [place] and being exposed to URI-  Grandson/   Sinus pressure, toothache, left sided maxillary pressure  Symptoms have been present for over 2-3 weeks  No fever  Uses Nebulizer 2-3 times per day-  Helps, some chest tightness, coughing , no wheezing  Yellow mucus expectoration    Patient with longstanding history of asthma on chronic inhaled corticosteroid therapy and previous oral steroid therapy  She reports strong family history of osteoporosis-mother  Early menopause  We will request bone density scan    Overall asthma symptoms have been well controlled  Patient uses Symbicort 2 puffs once a day and increase his dose to twice a day if necessary  Patient  follows healthy diet, maintains active lifestyle and overall has been feeling well  Review of Systems   Constitutional: Negative  HENT: Positive for congestion, sinus pressure and sinus pain  Eyes: Negative  Respiratory: Positive for cough and chest tightness  Negative for wheezing  Cardiovascular: Negative  Gastrointestinal: Negative  Endocrine: Negative  Genitourinary: Negative  Musculoskeletal: Negative  Skin: Negative  Allergic/Immunologic: Negative  Neurological: Negative  Hematological: Negative  Psychiatric/Behavioral: Negative  Past Medical History:   Diagnosis Date   • Allergic    • Anemia    • Asthma    • Pneumonia      Past Surgical History:   Procedure Laterality Date   • COLONOSCOPY N/A 4/29/2016    Procedure: COLONOSCOPY;  Surgeon: Jeff Hamilton DO;  Location: [facility];   Service:      Family History   Problem Relation Age of Onset   • No Known Problems Mother    • Other Father         bundle branch block; white blood cell disorder   • Asthma Daughter    • No Known Problems Sister    • No Known Problems Maternal Grandmother    • No Known Problems Maternal Grandfather    • No Known Problems Paternal Grandmother    • No Known Problems Paternal Grandfather    • No Known Problems Sister    • Asthma Daughter    • No Known Problems Paternal Aunt    • Kidney cancer Brother 39     Social History     Socioeconomic History   • Marital status: /Civil Union     Spouse name: None   • Number of children: None   • Years of education: None   • Highest education level: None   Occupational History   • None   Tobacco Use   • Smoking status: Never   • Smokeless tobacco: Never   Vaping Use   • Vaping Use: Never used   Substance and Sexual Activity   • Alcohol use: Yes     Alcohol/week: 1 0 standard drink     Types: 1 Glasses of wine per week     Comment: occasional   • Drug use: No   • Sexual activity: Yes     Partners: Male     Birth control/protection: Post-menopausal   Other Topics Concern   • None   Social History Narrative    , works full time     Social Determinants of Health     Financial Resource Strain: Not on file   Food Insecurity: Not on file   Transportation Needs: Not on file   Physical Activity: Not on file   Stress: Not on file   Social Connections: Not on file   Intimate Partner Violence: Not on file   Housing Stability: Not on file     Current Outpatient Medications on File Prior to Visit   Medication Sig   • Ascorbic Acid (VITAMIN C PO) Take 1 tablet by mouth daily   • BIOTIN PO Take 1 tablet by mouth daily   • CALCIUM PO Take 1 tablet by mouth daily   • Cholecalciferol (VITAMIN D-3 PO) Take 1,000 mg by mouth daily   • Collagenase POWD Take 1 Dose by mouth daily   • ELDERBERRY PO Take 1 capsule by mouth daily   • FOLIC ACID PO Take 1 tablet by mouth daily   • MAGNESIUM-ZINC PO Take 1 tablet by mouth daily   • predniSONE 10 mg tablet Take 4 tablets for 2 days, 3 tablets for 2 days, 2 tablets for 2 days, 1 tablet for 2 days       Allergies   Allergen Reactions   • Fluticasone-Salmeterol GI Intolerance     Category: Adverse Reaction;    • Milk-Related Compounds - Food Allergy Facial Swelling and Lip Swelling   • Mometasone Furo-Formoterol Fum GI Intolerance     Category: Adverse Reaction;    • Other Sneezing     Animal dander, pollen, and seasonal      Immunization History   Administered Date(s) Administered   • COVID-19 J&J (Michelle) vaccine 0 5 mL 03/27/2021   • COVID-19 MODERNA VACC 0 5 ML IM 11/11/2021   • COVID-19 Pfizer vac (Emile-sucrose, gray cap) 12 yr+ IM 09/23/2022   • Fluzone Split Quad 0 25 mL 10/28/2019   • INFLUENZA 12/31/2018, 11/14/2020, 09/23/2022   • Influenza, seasonal, injectable, preservative free 12/31/2018   • Tdap 04/18/2018       Objective     /80 (BP Location: Left arm, Patient Position: Sitting, Cuff Size: Standard)   Pulse 94   Temp 98 °F (36 7 °C) (Temporal)   Resp 16   Ht 5' 6" (1 676 m)   Wt 66 9 kg (147 lb 6 4 oz)   SpO2 95%   BMI 23 79 kg/m²     Physical Exam  Vitals and nursing note reviewed  Constitutional:       General: She is not in acute distress  Appearance: Normal appearance  She is well-developed  She is not ill-appearing  HENT:      Head: Normocephalic and atraumatic  Comments: Left maxillary sinus tenderness     Nose: Nose normal       Mouth/Throat:      Mouth: Mucous membranes are moist       Pharynx: Posterior oropharyngeal erythema present  Comments: Thrush hard palate  Eyes:      General: No scleral icterus  Conjunctiva/sclera: Conjunctivae normal    Neck:      Thyroid: No thyromegaly  Vascular: No carotid bruit  Cardiovascular:      Rate and Rhythm: Normal rate and regular rhythm  Heart sounds: Normal heart sounds  No murmur heard  Pulmonary:      Effort: Pulmonary effort is normal  No respiratory distress  Breath sounds: Normal breath sounds  No wheezing  Abdominal:      General: Bowel sounds are normal  There is no distension or abdominal bruit        Palpations: Abdomen is soft       Tenderness: There is no abdominal tenderness  Hernia: No hernia is present  Musculoskeletal:         General: Normal range of motion  Cervical back: Neck supple  No rigidity  Right lower leg: No edema  Left lower leg: No edema  Skin:     General: Skin is warm  Neurological:      General: No focal deficit present  Mental Status: She is alert and oriented to person, place, and time  Cranial Nerves: No cranial nerve deficit  Coordination: Coordination normal    Psychiatric:         Mood and Affect: Mood normal          Behavior: Behavior normal          Thought Content:  Thought content normal        Pamela Colon MD

## 2023-02-05 PROBLEM — E28.319 EARLY ONSET MENOPAUSE: Status: ACTIVE | Noted: 2023-02-05

## 2023-02-08 ENCOUNTER — TELEPHONE (OUTPATIENT)
Dept: FAMILY MEDICINE CLINIC | Facility: CLINIC | Age: 63
End: 2023-02-08

## 2023-02-08 DIAGNOSIS — R79.89 ELEVATED PLATELET COUNT: Primary | ICD-10-CM

## 2023-02-08 NOTE — TELEPHONE ENCOUNTER
Please mail order for platelet count to patient  Please ask her to review MyChart message that I sent today    Thank you

## 2023-06-01 ENCOUNTER — HOSPITAL ENCOUNTER (OUTPATIENT)
Dept: RADIOLOGY | Age: 63
Discharge: HOME | End: 2023-06-01

## 2023-06-01 VITALS — BODY MASS INDEX: 23.63 KG/M2 | WEIGHT: 147 LBS | HEIGHT: 66 IN

## 2023-06-01 DIAGNOSIS — Z12.31 ENCOUNTER FOR SCREENING MAMMOGRAM FOR BREAST CANCER: ICD-10-CM

## 2023-06-02 LAB — PLATELET # BLD AUTO: 240 THOUSAND/UL (ref 140–400)

## 2023-06-15 ENCOUNTER — OFFICE VISIT (OUTPATIENT)
Dept: FAMILY MEDICINE CLINIC | Facility: CLINIC | Age: 63
End: 2023-06-15
Payer: COMMERCIAL

## 2023-06-15 VITALS
BODY MASS INDEX: 24.59 KG/M2 | SYSTOLIC BLOOD PRESSURE: 118 MMHG | TEMPERATURE: 97.8 F | RESPIRATION RATE: 16 BRPM | DIASTOLIC BLOOD PRESSURE: 78 MMHG | HEIGHT: 66 IN | WEIGHT: 153 LBS | HEART RATE: 67 BPM | OXYGEN SATURATION: 98 %

## 2023-06-15 DIAGNOSIS — H65.92 FLUID LEVEL BEHIND TYMPANIC MEMBRANE OF LEFT EAR: Primary | ICD-10-CM

## 2023-06-15 PROCEDURE — 99213 OFFICE O/P EST LOW 20 MIN: CPT | Performed by: NURSE PRACTITIONER

## 2023-06-15 RX ORDER — AMOXICILLIN 875 MG/1
875 TABLET, COATED ORAL 2 TIMES DAILY
Qty: 20 TABLET | Refills: 0 | Status: SHIPPED | OUTPATIENT
Start: 2023-06-15 | End: 2023-06-25

## 2023-06-15 NOTE — PROGRESS NOTES
"   FAMILY PRACTICE OFFICE VISIT       NAME: Giacomo Terry  AGE: 59 y o  SEX: female       : 1960        MRN: [id number]    Assessment and Plan   1  Fluid level behind tympanic membrane of left ear  -     amoxicillin (AMOXIL) 875 mg tablet; Take 1 tablet (875 mg total) by mouth 2 (two) times a day for 10 days       Left ear middle ear effusion  Resume Zyrtec 10 mg daily  Intolerant to nasal sprays like flonase, mometasone  Start amoxicillin  Tylenol or ibuprofen as needed for ear pain  Call if ear pain is persistent, and may consider addition of corticosteroid  Chief Complaint     Chief Complaint   Patient presents with   • Earache     X4 days        History of Present Illness     Giacomo Terry is a 59year old female presenting today for ear pain  Last week started feel hearing was hollow  Last night pain started in left ear and left side of face  Couldn't lay on this side last night due to pain  Took Zyrtec, and aspirin  Avoids nasal sprays, intolerance, and prone to thrush  Early in the week, forgot to take Zyrtec regularly  Mild runny nose and watery eyes  Has been trying to stay in and keep windows closed  No fevers  Review of Systems   Review of Systems   Constitutional: Negative  HENT: Positive for ear pain and rhinorrhea  Eyes: Positive for discharge (watery eyes, mild)  Respiratory: Negative  Cardiovascular: Negative  I have reviewed the patient's medical history in detail; there are no changes to the history as noted in the electronic medical record      Objective     Vitals:    06/15/23 0752   BP: 118/78   BP Location: Left arm   Patient Position: Sitting   Cuff Size: Standard   Pulse: 67   Resp: 16   Temp: 97 8 °F (36 6 °C)   TempSrc: Temporal   SpO2: 98%   Weight: 69 4 kg (153 lb)   Height: 5' 6\" (1 676 m)     Wt Readings from Last 3 Encounters:   06/15/23 69 4 kg (153 lb)   23 66 7 kg (147 lb)   23 66 9 kg " (147 lb 6 4 oz)     Physical Exam  Vitals and nursing note reviewed  Constitutional:       General: She is not in acute distress  Appearance: Normal appearance  She is not ill-appearing  HENT:      Head: Atraumatic  Right Ear: Tympanic membrane normal       Left Ear: A middle ear effusion is present  Nose:      Comments: Pale boggy nasal turbinates     Mouth/Throat:      Mouth: Mucous membranes are moist       Pharynx: Oropharynx is clear  Eyes:      Conjunctiva/sclera: Conjunctivae normal    Cardiovascular:      Rate and Rhythm: Normal rate and regular rhythm  Heart sounds: No murmur heard  Pulmonary:      Effort: Pulmonary effort is normal  No respiratory distress  Breath sounds: Normal breath sounds  No wheezing or rales  Musculoskeletal:      Cervical back: Normal range of motion and neck supple  Lymphadenopathy:      Cervical: No cervical adenopathy  Neurological:      Mental Status: She is alert  Psychiatric:         Mood and Affect: Mood normal          Depression Screening and Follow-up Plan: Patient was screened for depression during today's encounter  They screened negative with a PHQ-2 score of 0  ALLERGIES:  Allergies   Allergen Reactions   • Fluticasone-Salmeterol GI Intolerance     Category: Adverse Reaction;    • Milk-Related Compounds - Food Allergy Facial Swelling and Lip Swelling   • Mometasone Furo-Formoterol Fum GI Intolerance     Category:  Adverse Reaction;    • Other Sneezing     Animal dander, pollen, and seasonal        Current Medications     Current Outpatient Medications   Medication Sig Dispense Refill   • albuterol (2 5 mg/3 mL) 0 083 % nebulizer solution Take 3 mL (2 5 mg total) by nebulization every 4 (four) hours as needed for wheezing or shortness of breath 270 mL 3   • albuterol (PROVENTIL HFA,VENTOLIN HFA) 90 mcg/act inhaler 2 puffs QID PRN,Patient prefers Ventolin brand 18 g 3   • amoxicillin (AMOXIL) 875 mg tablet Take 1 tablet (875 mg total) by mouth 2 (two) times a day for 10 days 20 tablet 0   • Ascorbic Acid (VITAMIN C PO) Take 1 tablet by mouth daily     • BIOTIN PO Take 1 tablet by mouth daily     • budesonide-formoterol (Symbicort) 160-4 5 mcg/act inhaler Inhale 2 puffs 2 (two) times a day Rinse mouth after use  10 2 g 5   • CALCIUM PO Take 1 tablet by mouth daily     • Cholecalciferol (VITAMIN D-3 PO) Take 1,000 mg by mouth daily     • Collagenase POWD Take 1 Dose by mouth daily     • ELDERBERRY PO Take 1 capsule by mouth daily     • FOLIC ACID PO Take 1 tablet by mouth daily     • MAGNESIUM-ZINC PO Take 1 tablet by mouth daily       No current facility-administered medications for this visit           Health Maintenance     Health Maintenance   Topic Date Due   • Hepatitis C Screening  Never done   • Pneumococcal Vaccine: Pediatrics (0 to 5 Years) and At-Risk Patients (6 to 59 Years) (1 - PCV) Never done   • HIV Screening  Never done   • Osteoporosis Screening  Never done   • COVID-19 Vaccine (4 - Booster for Michelle series) 11/18/2022   • Annual Physical  02/03/2024   • Cervical Cancer Screening  02/19/2024   • Depression Screening  06/15/2024   • BMI: Adult  06/15/2024   • Breast Cancer Screening: Mammogram  06/01/2025   • Colorectal Cancer Screening  04/29/2026   • DTaP,Tdap,and Td Vaccines (2 - Td or Tdap) 04/18/2028   • Influenza Vaccine  Completed   • HIB Vaccine  Aged Out   • IPV Vaccine  Aged Out   • Hepatitis A Vaccine  Aged Out   • Meningococcal ACWY Vaccine  Aged Out   • HPV Vaccine  Aged Out     Immunization History   Administered Date(s) Administered   • COVID-19 J&J (Michelle) vaccine 0 5 mL 03/27/2021   • COVID-19 MODERNA VACC 0 5 ML IM 11/11/2021   • COVID-19 Pfizer vac (Emile-sucrose, gray cap) 12 yr+ IM 09/23/2022   • Fluzone Split Quad 0 25 mL 10/28/2019   • INFLUENZA 12/31/2018, 11/14/2020, 09/23/2022   • Influenza, seasonal, injectable, preservative free 12/31/2018   • Tdap 04/18/2018       SOLOMON Gracia

## 2023-09-29 ENCOUNTER — VBI (OUTPATIENT)
Dept: ADMINISTRATIVE | Facility: OTHER | Age: 63
End: 2023-09-29

## 2023-12-07 DIAGNOSIS — J45.40 MODERATE PERSISTENT ASTHMA, UNSPECIFIED WHETHER COMPLICATED: ICD-10-CM

## 2023-12-07 RX ORDER — BUDESONIDE AND FORMOTEROL FUMARATE DIHYDRATE 160; 4.5 UG/1; UG/1
AEROSOL RESPIRATORY (INHALATION)
Qty: 10.2 G | Refills: 5 | Status: SHIPPED | OUTPATIENT
Start: 2023-12-07

## 2024-06-10 ENCOUNTER — HOSPITAL ENCOUNTER (OUTPATIENT)
Dept: RADIOLOGY | Age: 64
Discharge: HOME | End: 2024-06-10
Payer: COMMERCIAL

## 2024-06-10 VITALS — BODY MASS INDEX: 24.59 KG/M2 | WEIGHT: 153 LBS | HEIGHT: 66 IN

## 2024-06-10 DIAGNOSIS — Z12.31 VISIT FOR SCREENING MAMMOGRAM: ICD-10-CM

## 2024-06-10 PROCEDURE — 77063 BREAST TOMOSYNTHESIS BI: CPT

## 2024-06-10 PROCEDURE — 77067 SCR MAMMO BI INCL CAD: CPT

## 2024-06-12 DIAGNOSIS — Z12.31 ENCOUNTER FOR SCREENING MAMMOGRAM FOR BREAST CANCER: Primary | ICD-10-CM

## 2024-06-14 ENCOUNTER — VBI (OUTPATIENT)
Dept: ADMINISTRATIVE | Facility: OTHER | Age: 64
End: 2024-06-14

## 2024-06-14 NOTE — TELEPHONE ENCOUNTER
06/14/24 2:03 PM     Chart reviewed for Pap Smear (HPV) aka Cervical Cancer Screening ; nothing is submitted to the patient's insurance at this time.     Jenifer Crawford   [facility]

## 2024-10-19 ENCOUNTER — TELEPHONE (OUTPATIENT)
Dept: FAMILY MEDICINE CLINIC | Facility: CLINIC | Age: 64
End: 2024-10-19

## 2024-10-19 DIAGNOSIS — J45.40 MODERATE PERSISTENT ASTHMA, UNSPECIFIED WHETHER COMPLICATED: ICD-10-CM

## 2024-10-19 DIAGNOSIS — E78.5 DYSLIPIDEMIA: Primary | ICD-10-CM

## 2024-10-21 RX ORDER — BUDESONIDE AND FORMOTEROL FUMARATE DIHYDRATE 160; 4.5 UG/1; UG/1
2 AEROSOL RESPIRATORY (INHALATION) 2 TIMES DAILY
Qty: 10.2 G | Refills: 2 | Status: SHIPPED | OUTPATIENT
Start: 2024-10-21

## 2024-10-22 NOTE — TELEPHONE ENCOUNTER
Please contact the patient.    She is past due for annual physical, Pap smear and blood work.  Okay to schedule annual physical and Pap smear at the same time.    Blood work orders placed.    Thank you

## 2024-12-24 ENCOUNTER — OFFICE VISIT (OUTPATIENT)
Dept: FAMILY MEDICINE CLINIC | Facility: CLINIC | Age: 64
End: 2024-12-24
Payer: COMMERCIAL

## 2024-12-24 VITALS
RESPIRATION RATE: 16 BRPM | BODY MASS INDEX: 25.33 KG/M2 | HEIGHT: 66 IN | WEIGHT: 157.6 LBS | HEART RATE: 94 BPM | SYSTOLIC BLOOD PRESSURE: 124 MMHG | OXYGEN SATURATION: 99 % | DIASTOLIC BLOOD PRESSURE: 80 MMHG | TEMPERATURE: 97.8 F

## 2024-12-24 DIAGNOSIS — J45.40 MODERATE PERSISTENT ASTHMA, UNSPECIFIED WHETHER COMPLICATED: ICD-10-CM

## 2024-12-24 DIAGNOSIS — E78.5 DYSLIPIDEMIA: ICD-10-CM

## 2024-12-24 DIAGNOSIS — E55.9 VITAMIN D DEFICIENCY: ICD-10-CM

## 2024-12-24 DIAGNOSIS — J45.40 MODERATE PERSISTENT ASTHMA WITHOUT COMPLICATION: ICD-10-CM

## 2024-12-24 DIAGNOSIS — Z12.4 SCREENING FOR CERVICAL CANCER: ICD-10-CM

## 2024-12-24 DIAGNOSIS — Z00.00 ENCOUNTER FOR WELLNESS EXAMINATION IN ADULT: Primary | ICD-10-CM

## 2024-12-24 DIAGNOSIS — Z23 ENCOUNTER FOR IMMUNIZATION: ICD-10-CM

## 2024-12-24 PROCEDURE — 99396 PREV VISIT EST AGE 40-64: CPT | Performed by: FAMILY MEDICINE

## 2024-12-24 PROCEDURE — G0476 HPV COMBO ASSAY CA SCREEN: HCPCS | Performed by: FAMILY MEDICINE

## 2024-12-24 PROCEDURE — 90673 RIV3 VACCINE NO PRESERV IM: CPT

## 2024-12-24 PROCEDURE — G0145 SCR C/V CYTO,THINLAYER,RESCR: HCPCS | Performed by: FAMILY MEDICINE

## 2024-12-24 PROCEDURE — 90471 IMMUNIZATION ADMIN: CPT

## 2024-12-24 RX ORDER — ALBUTEROL SULFATE 90 UG/1
INHALANT RESPIRATORY (INHALATION)
Qty: 54 G | Refills: 3 | Status: SHIPPED | OUTPATIENT
Start: 2024-12-24

## 2024-12-24 RX ORDER — BUDESONIDE AND FORMOTEROL FUMARATE DIHYDRATE 160; 4.5 UG/1; UG/1
2 AEROSOL RESPIRATORY (INHALATION) 2 TIMES DAILY
Qty: 30.6 G | Refills: 5 | Status: SHIPPED | OUTPATIENT
Start: 2024-12-24

## 2024-12-24 RX ORDER — ALBUTEROL SULFATE 0.83 MG/ML
2.5 SOLUTION RESPIRATORY (INHALATION) EVERY 4 HOURS PRN
Qty: 360 ML | Refills: 3 | Status: SHIPPED | OUTPATIENT
Start: 2024-12-24

## 2024-12-24 NOTE — ASSESSMENT & PLAN NOTE
Orders:  •  albuterol (PROVENTIL HFA,VENTOLIN HFA) 90 mcg/act inhaler; 2 puffs QID PRN,Patient prefers Ventolin brand  •  albuterol (2.5 mg/3 mL) 0.083 % nebulizer solution; Take 3 mL (2.5 mg total) by nebulization every 4 (four) hours as needed for wheezing or shortness of breath

## 2024-12-24 NOTE — ASSESSMENT & PLAN NOTE
Symptoms are well-controlled.  Continue  Orders:  •  budesonide-formoterol (Symbicort) 160-4.5 mcg/act inhaler; Inhale 2 puffs 2 (two) times a day Rinse mouth after use

## 2024-12-24 NOTE — PROGRESS NOTES
Name: Josephine Lr      : 1960      MRN: [id number]  Encounter Provider: Gardenia Sunshine MD  Encounter Date: 2024   Encounter department: [facility]    Assessment & Plan  Encounter for wellness examination in adult  Patient is up-to-date with colorectal screening/colonoscopy and mammogram.  Pap and HPV testing was performed today.  Proceed with blood work.  Flu vaccine was administered.       Moderate persistent asthma, unspecified whether complicated  Symptoms are well-controlled.  Continue  Orders:  •  budesonide-formoterol (Symbicort) 160-4.5 mcg/act inhaler; Inhale 2 puffs 2 (two) times a day Rinse mouth after use    Vitamin D deficiency    Orders:  •  Vitamin D 25 hydroxy; Future    Dyslipidemia  Proceed with routine blood work  Orders:  •  CBC and differential; Future  •  Comprehensive metabolic panel; Future  •  Lipid Panel with Direct LDL reflex; Future  •  TSH, 3rd generation; Future    Moderate persistent asthma without complication    Orders:  •  albuterol (PROVENTIL HFA,VENTOLIN HFA) 90 mcg/act inhaler; 2 puffs QID PRN,Patient prefers Ventolin brand  •  albuterol (2.5 mg/3 mL) 0.083 % nebulizer solution; Take 3 mL (2.5 mg total) by nebulization every 4 (four) hours as needed for wheezing or shortness of breath    Encounter for immunization    Orders:  •  influenza vaccine, recombinant, PF, 0.5 mL IM (Flublok)    Screening for cervical cancer    Orders:  •  Liquid-based pap, screening; Future  •  HPV High Risk         History of Present Illness     Annual well exam, follow-up.  Patient has been feeling generally well.  No complaints.  She is here for general physical and GYN exam/Pap smear.  Asthma symptoms have been well-controlled.  Patient is due for routine blood work.  She is up-to-date with colonoscopy (due in ) and mammogram (last study 2024).      Review of Systems   Constitutional: Negative.    HENT: Negative.     Eyes: Negative.     Respiratory: Negative.     Cardiovascular: Negative.    Gastrointestinal: Negative.    Endocrine: Negative.    Genitourinary: Negative.    Musculoskeletal: Negative.    Allergic/Immunologic: Negative.    Neurological: Negative.    Hematological: Negative.    Psychiatric/Behavioral: Negative.       Past Medical History:   Diagnosis Date   • Allergic    • Anemia    • Asthma    • Pneumonia      Past Surgical History:   Procedure Laterality Date   • COLONOSCOPY N/A 4/29/2016    Procedure: COLONOSCOPY;  Surgeon: Josephine Gaitan DO;  Location: [facility];  Service:      Family History   Problem Relation Age of Onset   • No Known Problems Mother    • Other Father         bundle branch block; white blood cell disorder   • No Known Problems Sister    • No Known Problems Sister    • Asthma Daughter    • Asthma Daughter    • No Known Problems Maternal Grandmother    • No Known Problems Maternal Grandfather    • No Known Problems Paternal Grandmother    • No Known Problems Paternal Grandfather    • Kidney cancer Brother 45   • No Known Problems Paternal Aunt      Social History     Tobacco Use   • Smoking status: Never   • Smokeless tobacco: Never   Vaping Use   • Vaping status: Never Used   Substance and Sexual Activity   • Alcohol use: Yes     Alcohol/week: 1.0 standard drink of alcohol     Types: 1 Glasses of wine per week     Comment: occasional   • Drug use: No   • Sexual activity: Yes     Partners: Male     Birth control/protection: Post-menopausal     Current Outpatient Medications on File Prior to Visit   Medication Sig   • Ascorbic Acid (VITAMIN C PO) Take 1 tablet by mouth daily   • BIOTIN PO Take 1 tablet by mouth daily   • CALCIUM PO Take 1 tablet by mouth daily   • Cholecalciferol (VITAMIN D-3 PO) Take 1,000 mg by mouth daily   • Collagenase POWD Take 1 Dose by mouth daily   • FOLIC ACID PO Take 1 tablet by mouth daily   • MAGNESIUM-ZINC PO Take 1 tablet by mouth daily   • ELDERBERRY PO Take 1 capsule by  "mouth daily     Allergies   Allergen Reactions   • Fluticasone-Salmeterol GI Intolerance     Category: Adverse Reaction;    • Milk-Related Compounds - Food Allergy Facial Swelling and Lip Swelling   • Mometasone Furo-Formoterol Fum GI Intolerance     Category: Adverse Reaction;    • Other Sneezing     Animal dander, pollen, and seasonal      Immunization History   Administered Date(s) Administered   • COVID-19 J&J (Michelle) vaccine 0.5 mL 03/27/2021   • COVID-19 MODERNA VACC 0.5 ML IM 11/11/2021   • COVID-19 Pfizer Vac BIVALENT Emlie-sucrose 12 Yr+ IM 09/23/2022   • COVID-19 Pfizer mRNA vacc PF emile-sucrose 12 yr and older (Comirnaty) 10/16/2023, 08/30/2024   • Fluzone Split Quad 0.25 mL 10/28/2019   • INFLUENZA 12/31/2018, 11/14/2020, 10/29/2021, 09/23/2022, 10/16/2023   • Influenza Recombinant Preservative Free Im 12/24/2024   • Influenza, seasonal, injectable, preservative free 12/31/2018   • Respiratory Syncytial Virus Vaccine (Recombinant, Adjuvanted) 10/29/2023   • Tdap 04/18/2018     Objective   /80 (BP Location: Right arm, Patient Position: Sitting, Cuff Size: Standard)   Pulse 94   Temp 97.8 °F (36.6 °C) (Temporal)   Resp 16   Ht 5' 6\" (1.676 m)   Wt 71.5 kg (157 lb 9.6 oz)   SpO2 99%   BMI 25.44 kg/m²     Physical Exam  Vitals and nursing note reviewed. Exam conducted with a chaperone present.   Constitutional:       General: She is not in acute distress.     Appearance: Normal appearance. She is well-developed. She is not ill-appearing.   HENT:      Head: Normocephalic and atraumatic.   Eyes:      General: No scleral icterus.  Neck:      Thyroid: No thyromegaly.   Cardiovascular:      Rate and Rhythm: Normal rate and regular rhythm.      Heart sounds: Normal heart sounds. No murmur heard.  Pulmonary:      Effort: Pulmonary effort is normal. No respiratory distress.      Breath sounds: Normal breath sounds.   Chest:   Breasts:     Breasts are symmetrical.      Right: Normal. No mass.      Left: " Normal. No mass.   Abdominal:      General: Bowel sounds are normal. There is no distension.      Palpations: Abdomen is soft.      Tenderness: There is no abdominal tenderness.      Hernia: No hernia is present.   Genitourinary:     General: Normal vulva.      Labia:         Right: No rash.         Left: No rash.       Vagina: Normal. No vaginal discharge.      Cervix: Normal. No cervical motion tenderness, discharge or lesion.      Uterus: Normal. Not tender.       Adnexa:         Right: No mass, tenderness or fullness.          Left: No mass, tenderness or fullness.     Musculoskeletal:         General: Normal range of motion.      Cervical back: Neck supple.      Right lower leg: No edema.      Left lower leg: No edema.   Lymphadenopathy:      Upper Body:      Right upper body: No axillary or pectoral adenopathy.      Left upper body: No axillary or pectoral adenopathy.   Neurological:      Mental Status: She is alert and oriented to person, place, and time.      Cranial Nerves: No cranial nerve deficit.   Psychiatric:         Mood and Affect: Mood normal.         Behavior: Behavior normal.         Thought Content: Thought content normal.

## 2024-12-25 LAB
25(OH)D3 SERPL-MCNC: 51 NG/ML (ref 30–100)
ALBUMIN SERPL-MCNC: 4.2 G/DL (ref 3.6–5.1)
ALBUMIN/GLOB SERPL: 1.8 (CALC) (ref 1–2.5)
ALP SERPL-CCNC: 47 U/L (ref 37–153)
ALT SERPL-CCNC: 12 U/L (ref 6–29)
AST SERPL-CCNC: 15 U/L (ref 10–35)
BASOPHILS # BLD AUTO: 19 CELLS/UL (ref 0–200)
BASOPHILS NFR BLD AUTO: 0.4 %
BILIRUB SERPL-MCNC: 0.4 MG/DL (ref 0.2–1.2)
BUN SERPL-MCNC: 22 MG/DL (ref 7–25)
BUN/CREAT SERPL: NORMAL (CALC) (ref 6–22)
CALCIUM SERPL-MCNC: 9.7 MG/DL (ref 8.6–10.4)
CHLORIDE SERPL-SCNC: 105 MMOL/L (ref 98–110)
CHOLEST SERPL-MCNC: 235 MG/DL
CHOLEST/HDLC SERPL: 2.8 (CALC)
CO2 SERPL-SCNC: 27 MMOL/L (ref 20–32)
CREAT SERPL-MCNC: 0.78 MG/DL (ref 0.5–1.05)
EOSINOPHIL # BLD AUTO: 99 CELLS/UL (ref 15–500)
EOSINOPHIL NFR BLD AUTO: 2.1 %
ERYTHROCYTE [DISTWIDTH] IN BLOOD BY AUTOMATED COUNT: 11.9 % (ref 11–15)
GFR/BSA.PRED SERPLBLD CYS-BASED-ARV: 85 ML/MIN/1.73M2
GLOBULIN SER CALC-MCNC: 2.3 G/DL (CALC) (ref 1.9–3.7)
GLUCOSE SERPL-MCNC: 88 MG/DL (ref 65–99)
HCT VFR BLD AUTO: 38.2 % (ref 35–45)
HDLC SERPL-MCNC: 85 MG/DL
HGB BLD-MCNC: 12.7 G/DL (ref 11.7–15.5)
LDLC SERPL CALC-MCNC: 134 MG/DL (CALC)
LYMPHOCYTES # BLD AUTO: 1509 CELLS/UL (ref 850–3900)
LYMPHOCYTES NFR BLD AUTO: 32.1 %
MCH RBC QN AUTO: 31 PG (ref 27–33)
MCHC RBC AUTO-ENTMCNC: 33.2 G/DL (ref 32–36)
MCV RBC AUTO: 93.2 FL (ref 80–100)
MONOCYTES # BLD AUTO: 362 CELLS/UL (ref 200–950)
MONOCYTES NFR BLD AUTO: 7.7 %
NEUTROPHILS # BLD AUTO: 2712 CELLS/UL (ref 1500–7800)
NEUTROPHILS NFR BLD AUTO: 57.7 %
NONHDLC SERPL-MCNC: 150 MG/DL (CALC)
PLATELET # BLD AUTO: 257 THOUSAND/UL (ref 140–400)
PMV BLD REES-ECKER: 10.4 FL (ref 7.5–12.5)
POTASSIUM SERPL-SCNC: 4.6 MMOL/L (ref 3.5–5.3)
PROT SERPL-MCNC: 6.5 G/DL (ref 6.1–8.1)
RBC # BLD AUTO: 4.1 MILLION/UL (ref 3.8–5.1)
SODIUM SERPL-SCNC: 140 MMOL/L (ref 135–146)
TRIGL SERPL-MCNC: 64 MG/DL
TSH SERPL-ACNC: 1.11 MIU/L (ref 0.4–4.5)
WBC # BLD AUTO: 4.7 THOUSAND/UL (ref 3.8–10.8)

## 2024-12-26 ENCOUNTER — RESULTS FOLLOW-UP (OUTPATIENT)
Dept: FAMILY MEDICINE CLINIC | Facility: CLINIC | Age: 64
End: 2024-12-26

## 2024-12-29 ENCOUNTER — RESULTS FOLLOW-UP (OUTPATIENT)
Dept: FAMILY MEDICINE CLINIC | Facility: CLINIC | Age: 64
End: 2024-12-29

## 2024-12-30 LAB
LAB AP GYN PRIMARY INTERPRETATION: NORMAL
Lab: NORMAL

## 2025-05-22 DIAGNOSIS — E78.5 DYSLIPIDEMIA: Primary | ICD-10-CM

## 2025-05-28 ENCOUNTER — APPOINTMENT (OUTPATIENT)
Dept: LAB | Facility: CLINIC | Age: 65
End: 2025-05-28
Payer: COMMERCIAL

## 2025-05-28 DIAGNOSIS — E78.5 DYSLIPIDEMIA: ICD-10-CM

## 2025-05-28 LAB
CHOLEST SERPL-MCNC: 205 MG/DL (ref ?–200)
HDLC SERPL-MCNC: 71 MG/DL
LDLC SERPL CALC-MCNC: 123 MG/DL (ref 0–100)
TRIGL SERPL-MCNC: 55 MG/DL (ref ?–150)

## 2025-05-28 PROCEDURE — 80061 LIPID PANEL: CPT

## 2025-05-28 PROCEDURE — 36415 COLL VENOUS BLD VENIPUNCTURE: CPT

## 2025-05-29 ENCOUNTER — RESULTS FOLLOW-UP (OUTPATIENT)
Dept: FAMILY MEDICINE CLINIC | Facility: CLINIC | Age: 65
End: 2025-05-29

## 2025-06-25 ENCOUNTER — HOSPITAL ENCOUNTER (OUTPATIENT)
Facility: HOSPITAL | Age: 65
Discharge: HOME | End: 2025-06-25
Payer: COMMERCIAL

## 2025-06-25 DIAGNOSIS — Z12.31 ENCOUNTER FOR SCREENING MAMMOGRAM FOR BREAST CANCER: ICD-10-CM

## 2025-06-25 PROCEDURE — 77063 BREAST TOMOSYNTHESIS BI: CPT

## 2025-06-25 PROCEDURE — 77067 SCR MAMMO BI INCL CAD: CPT
